# Patient Record
Sex: FEMALE | Race: AMERICAN INDIAN OR ALASKA NATIVE | HISPANIC OR LATINO | Employment: OTHER | URBAN - METROPOLITAN AREA
[De-identification: names, ages, dates, MRNs, and addresses within clinical notes are randomized per-mention and may not be internally consistent; named-entity substitution may affect disease eponyms.]

---

## 2022-03-17 ENCOUNTER — OFFICE VISIT (OUTPATIENT)
Dept: FAMILY MEDICINE CLINIC | Facility: CLINIC | Age: 67
End: 2022-03-17

## 2022-03-17 VITALS
BODY MASS INDEX: 27.08 KG/M2 | HEIGHT: 55 IN | WEIGHT: 117 LBS | SYSTOLIC BLOOD PRESSURE: 128 MMHG | DIASTOLIC BLOOD PRESSURE: 86 MMHG | TEMPERATURE: 96.6 F | HEART RATE: 70 BPM | RESPIRATION RATE: 18 BRPM | OXYGEN SATURATION: 98 %

## 2022-03-17 DIAGNOSIS — Z11.59 NEED FOR HEPATITIS C SCREENING TEST: ICD-10-CM

## 2022-03-17 DIAGNOSIS — Z12.11 COLON CANCER SCREENING: ICD-10-CM

## 2022-03-17 DIAGNOSIS — R41.3 MEMORY LOSS: ICD-10-CM

## 2022-03-17 DIAGNOSIS — Z00.00 ENCOUNTER FOR MEDICAL EXAMINATION TO ESTABLISH CARE: Primary | ICD-10-CM

## 2022-03-17 DIAGNOSIS — R73.09 OTHER ABNORMAL GLUCOSE: ICD-10-CM

## 2022-03-17 DIAGNOSIS — Z23 ENCOUNTER FOR IMMUNIZATION: ICD-10-CM

## 2022-03-17 DIAGNOSIS — Z13.820 OSTEOPOROSIS SCREENING: ICD-10-CM

## 2022-03-17 DIAGNOSIS — Z12.31 ENCOUNTER FOR SCREENING MAMMOGRAM FOR BREAST CANCER: ICD-10-CM

## 2022-03-17 DIAGNOSIS — E66.3 OVERWEIGHT (BMI 25.0-29.9): ICD-10-CM

## 2022-03-17 DIAGNOSIS — M81.0 AGE-RELATED OSTEOPOROSIS WITHOUT CURRENT PATHOLOGICAL FRACTURE: ICD-10-CM

## 2022-03-17 PROCEDURE — 90662 IIV NO PRSV INCREASED AG IM: CPT

## 2022-03-17 PROCEDURE — G0009 ADMIN PNEUMOCOCCAL VACCINE: HCPCS

## 2022-03-17 PROCEDURE — 99204 OFFICE O/P NEW MOD 45 MIN: CPT

## 2022-03-17 PROCEDURE — G0008 ADMIN INFLUENZA VIRUS VAC: HCPCS

## 2022-03-17 PROCEDURE — 90732 PPSV23 VACC 2 YRS+ SUBQ/IM: CPT

## 2022-03-17 NOTE — ASSESSMENT & PLAN NOTE
BMI Counseling: Body mass index is 27 19 kg/m²  The BMI is above normal  Nutrition recommendations include reducing portion sizes, decreasing overall calorie intake, 3-5 servings of fruits/vegetables daily, reducing fast food intake, consuming healthier snacks, decreasing soda and/or juice intake, moderation in carbohydrate intake, increasing intake of lean protein, reducing intake of saturated fat and trans fat and reducing intake of cholesterol  Exercise recommendations include moderate aerobic physical activity for 150 minutes/week   such as walking

## 2022-03-17 NOTE — ASSESSMENT & PLAN NOTE
Reviewed care gaps with pt & importance of completing all tasks for complete healthcare maintenance  Unable to get tdap in office d/t medicare restriction   -Advised pt to schedule cervical CA screening appt  Up to date on covid-19 vaccine   Advised to bring vaccination card to next OV

## 2022-03-17 NOTE — PROGRESS NOTES
Assessment/Plan:    Overweight (BMI 25 0-29  9)  BMI Counseling: Body mass index is 27 19 kg/m²  The BMI is above normal  Nutrition recommendations include reducing portion sizes, decreasing overall calorie intake, 3-5 servings of fruits/vegetables daily, reducing fast food intake, consuming healthier snacks, decreasing soda and/or juice intake, moderation in carbohydrate intake, increasing intake of lean protein, reducing intake of saturated fat and trans fat and reducing intake of cholesterol  Exercise recommendations include moderate aerobic physical activity for 150 minutes/week  such as walking      Encounter for medical examination to establish care  Reviewed care gaps with pt & importance of completing all tasks for complete healthcare maintenance  Unable to get tdap in office d/t medicare restriction   -Advised pt to schedule cervical CA screening appt  Up to date on covid-19 vaccine  Advised to bring vaccination card to next OV    Memory loss  RTC in about 4 weeks for AWV & MOCA test       Diagnoses and all orders for this visit:    Encounter for medical examination to establish care    Osteoporosis screening  -     DXA bone density spine hip and pelvis; Future    Age-related osteoporosis without current pathological fracture   -     DXA bone density spine hip and pelvis; Future    Need for hepatitis C screening test  -     Hepatitis C Antibody ([facility]); Future    Encounter for screening mammogram for breast cancer  -     Mammo screening bilateral w 3d & cad; Future    Encounter for immunization  -     PNEUMOCOCCAL POLYSACCHARIDE VACCINE 23-VALENT =>1YO SQ IM  -     influenza vaccine, high-dose, PF 0 7 mL (FLUZONE HIGH-DOSE)    Overweight (BMI 25 0-29 9)  -     Comprehensive metabolic panel; Future  -     CBC and differential; Future  -     Lipid panel; Future  -     HEMOGLOBIN A1C W/ EAG ESTIMATION;  Future    Colon cancer screening  -     Ambulatory Referral to Gastroenterology; Future    Other abnormal glucose   -     HEMOGLOBIN A1C W/ EAG ESTIMATION; Future    Memory loss  -     Vitamin B12; Future  -     TSH, 3rd generation with Free T4 reflex; Future  -     Folate; Future  -     RPR; Future  -     HIV 1/2 Antigen/Antibody (4th Generation) w Reflex [facility]; Future  -     Lyme Total Antibody Profile with reflex to WB; Future          Subjective:      Patient ID: Radha Pathak is a 74 y o  female  Radha Pathak is a 74 y o  female  has no past medical history on file  has a past surgical history that includes Appendectomy  Pt presents today to establish care  Denies any acute complaints  The following portions of the patient's history were reviewed and updated as appropriate: allergies, current medications, past family history, past medical history, past social history, past surgical history and problem list     Review of Systems   Constitutional: Negative for chills and fever  HENT: Negative for ear pain and sore throat  Eyes: Negative for pain and visual disturbance  Respiratory: Negative for cough and shortness of breath  Cardiovascular: Negative for chest pain and palpitations  Gastrointestinal: Negative for abdominal pain and vomiting  Genitourinary: Negative for dysuria and hematuria  Musculoskeletal: Negative for arthralgias and back pain  Skin: Negative for color change and rash  Neurological: Negative for seizures and syncope  All other systems reviewed and are negative  Objective:      /86 (BP Location: Left arm, Patient Position: Sitting, Cuff Size: Adult)   Pulse 70   Temp (!) 96 6 °F (35 9 °C) (Temporal)   Resp 18   Ht 4' 7" (1 397 m)   Wt 53 1 kg (117 lb)   SpO2 98%   BMI 27 19 kg/m²          Physical Exam  Vitals and nursing note reviewed  Constitutional:       Appearance: Normal appearance  HENT:      Head: Normocephalic and atraumatic        Right Ear: Tympanic membrane, ear canal and external ear normal       Left Ear: Tympanic membrane, ear canal and external ear normal       Nose: Nose normal       Mouth/Throat:      Mouth: Mucous membranes are moist       Pharynx: Oropharynx is clear  Eyes:      Extraocular Movements: Extraocular movements intact  Conjunctiva/sclera: Conjunctivae normal       Pupils: Pupils are equal, round, and reactive to light  Cardiovascular:      Rate and Rhythm: Normal rate and regular rhythm  Pulses: Normal pulses  Heart sounds: Normal heart sounds  Pulmonary:      Effort: Pulmonary effort is normal       Breath sounds: Normal breath sounds  Abdominal:      General: Bowel sounds are normal       Palpations: Abdomen is soft  Tenderness: There is no abdominal tenderness  Musculoskeletal:         General: Normal range of motion  Cervical back: Normal range of motion  Skin:     General: Skin is warm and dry  Neurological:      General: No focal deficit present  Mental Status: She is alert and oriented to person, place, and time  Mental status is at baseline  Psychiatric:         Mood and Affect: Mood normal          Behavior: Behavior normal          Thought Content:  Thought content normal          Judgment: Judgment normal

## 2022-05-09 ENCOUNTER — HOSPITAL ENCOUNTER (OUTPATIENT)
Dept: MAMMOGRAPHY | Facility: CLINIC | Age: 67
Discharge: HOME | End: 2022-05-09
Payer: MEDICARE

## 2022-05-09 DIAGNOSIS — Z12.31 ENCOUNTER FOR SCREENING MAMMOGRAM FOR BREAST CANCER: ICD-10-CM

## 2022-05-09 PROCEDURE — 77067 SCR MAMMO BI INCL CAD: CPT

## 2022-05-09 PROCEDURE — 77063 BREAST TOMOSYNTHESIS BI: CPT

## 2022-05-19 ENCOUNTER — OFFICE VISIT (OUTPATIENT)
Dept: OBGYN CLINIC | Facility: CLINIC | Age: 67
End: 2022-05-19

## 2022-05-19 VITALS
SYSTOLIC BLOOD PRESSURE: 154 MMHG | WEIGHT: 120.8 LBS | DIASTOLIC BLOOD PRESSURE: 82 MMHG | BODY MASS INDEX: 28.08 KG/M2 | HEART RATE: 76 BPM

## 2022-05-19 DIAGNOSIS — Z12.31 ENCOUNTER FOR SCREENING MAMMOGRAM FOR MALIGNANT NEOPLASM OF BREAST: ICD-10-CM

## 2022-05-19 DIAGNOSIS — Z12.39 ENCOUNTER FOR BREAST CANCER SCREENING USING NON-MAMMOGRAM MODALITY: ICD-10-CM

## 2022-05-19 DIAGNOSIS — Z01.419 ENCOUNTER FOR GYNECOLOGICAL EXAMINATION WITHOUT ABNORMAL FINDING: Primary | ICD-10-CM

## 2022-05-19 DIAGNOSIS — Z12.4 SCREENING FOR CERVICAL CANCER: ICD-10-CM

## 2022-05-19 PROBLEM — Z00.00 ENCOUNTER FOR MEDICAL EXAMINATION TO ESTABLISH CARE: Status: RESOLVED | Noted: 2022-03-17 | Resolved: 2022-05-19

## 2022-05-19 PROBLEM — E66.3 OVERWEIGHT (BMI 25.0-29.9): Status: RESOLVED | Noted: 2022-03-17 | Resolved: 2022-05-19

## 2022-05-19 PROBLEM — R41.3 MEMORY LOSS: Status: RESOLVED | Noted: 2022-03-17 | Resolved: 2022-05-19

## 2022-05-19 PROCEDURE — G0101 CA SCREEN;PELVIC/BREAST EXAM: HCPCS | Performed by: NURSE PRACTITIONER

## 2022-05-19 NOTE — PROGRESS NOTES
ANNUAL GYNECOLOGICAL EXAMINATION    French Reaves is a 74 y o  female who presents today for annual GYN exam   She is unsure when her last pap smear was performed but she reports no history of abnormal pap smears in her past   Her last mammogram was performed 2022 and was WNL  She has never had colon cancer screening performed, but is scheduled for consultation with GI on 2022 to discuss colonoscopy  She reports menses as absent since   Her general medical history has been reviewed and she reports it as follows:    Past Medical History:   Diagnosis Date    No known health problems      Past Surgical History:   Procedure Laterality Date    APPENDECTOMY       OB History        1    Para   1    Term   1       0    AB   0    Living   1       SAB   0    IAB   0    Ectopic   0    Multiple   0    Live Births   1               Social History     Tobacco Use    Smoking status: Never Smoker    Smokeless tobacco: Never Used   Vaping Use    Vaping Use: Never used   Substance Use Topics    Alcohol use: Never    Drug use: Never     Social History     Substance and Sexual Activity   Sexual Activity Not Currently    Birth control/protection: Post-menopausal     Cancer-related family history is negative for Breast cancer, Colon cancer, Ovarian cancer, and Cancer  No current outpatient medications    Review of Systems:  Review of Systems   Constitutional: Negative  Gastrointestinal: Negative  Genitourinary: Negative for difficulty urinating, menstrual problem, pelvic pain and vaginal discharge  Skin: Negative  Physical Exam:  /82   Pulse 76   Wt 54 8 kg (120 lb 12 8 oz)   BMI 28 08 kg/m²   Physical Exam  Constitutional:       General: She is not in acute distress  Appearance: She is well-developed  Genitourinary:      Vulva normal       No lesions in the vagina  Moderate vaginal atrophy present         Right Adnexa: not tender and no mass present  Left Adnexa: not tender and no mass present  No cervical motion tenderness  Uterus is not tender  Breasts:      Right: No mass, nipple discharge, skin change or tenderness  Left: No mass, nipple discharge, skin change or tenderness  Neck:      Thyroid: No thyromegaly  Cardiovascular:      Rate and Rhythm: Normal rate and regular rhythm  Pulmonary:      Effort: Pulmonary effort is normal    Abdominal:      Palpations: Abdomen is soft  Tenderness: There is no abdominal tenderness  Musculoskeletal:      Cervical back: Neck supple  Neurological:      Mental Status: She is alert and oriented to person, place, and time  Skin:     General: Skin is warm and dry  Vitals reviewed  Speculum exam deferred  Assessment/Plan:   1  Normal well-woman GYN exam   2  Cervical cancer screening:  Normal pelvic exam   Pap smear and cervical cancer screening no longer indicated  3  STD screening:  Patient declines  4  Breast cancer screening:  Normal breast exam   Repeat screening mammogram next year  5  Colon cancer screening:  Continue with GI consultation as previously scheduled per PCP on 6/20/2022  6  Depression Screening: Patient's depression screening was assessed with a PHQ-2 score of 1  Their PHQ-9 score was 3  Clinically patient does not have depression  No treatment is required  7  BMI Counseling: Body mass index is 28 08 kg/m²  No intervention indicated  8  Return to GYN is only indicated if she is experiencing GYN issues  Otherwise, she can follow with her PCP for routine cancer screenings

## 2022-06-20 ENCOUNTER — HOSPITAL ENCOUNTER (OUTPATIENT)
Dept: BONE DENSITY | Facility: CLINIC | Age: 67
Discharge: HOME | End: 2022-06-20
Payer: MEDICARE

## 2022-06-20 ENCOUNTER — OFFICE VISIT (OUTPATIENT)
Dept: GASTROENTEROLOGY | Facility: CLINIC | Age: 67
End: 2022-06-20
Payer: MEDICARE

## 2022-06-20 VITALS
HEIGHT: 55 IN | WEIGHT: 118.5 LBS | BODY MASS INDEX: 27.42 KG/M2 | OXYGEN SATURATION: 99 % | HEART RATE: 84 BPM | DIASTOLIC BLOOD PRESSURE: 74 MMHG | SYSTOLIC BLOOD PRESSURE: 132 MMHG

## 2022-06-20 DIAGNOSIS — M81.0 AGE-RELATED OSTEOPOROSIS WITHOUT CURRENT PATHOLOGICAL FRACTURE: ICD-10-CM

## 2022-06-20 DIAGNOSIS — Z13.820 OSTEOPOROSIS SCREENING: ICD-10-CM

## 2022-06-20 DIAGNOSIS — M85.89 OSTEOPENIA OF MULTIPLE SITES: Primary | ICD-10-CM

## 2022-06-20 DIAGNOSIS — Z12.11 COLON CANCER SCREENING: ICD-10-CM

## 2022-06-20 PROCEDURE — 99203 OFFICE O/P NEW LOW 30 MIN: CPT | Performed by: STUDENT IN AN ORGANIZED HEALTH CARE EDUCATION/TRAINING PROGRAM

## 2022-06-20 PROCEDURE — 77080 DXA BONE DENSITY AXIAL: CPT

## 2022-06-20 NOTE — PROGRESS NOTES
[facility] - Outpatient Note  Raymond Haywood 69 y o  female MRN: [id number]  Encounter: [id number]      ASSESSMENT AND PLAN:    Raymond Haywood is a 69 y o  old female with PMH of appendectomy, memory loss who presents to discuss colonoscopy for colon cancer screening  Colon cancer screening - unclear if patient had a colonoscopy in the past   I suspect patient has never had a colonoscopy  No family history of colon cancer  Asymptomatic  No warning symptoms  · Plan for colonoscopy        1  Colon cancer screening  - Ambulatory Referral to Gastroenterology    ______________________________________________________________________    SUBJECTIVE:  Raymond Haywood is a 69 y o  old female with PMH of been duct who presents to discuss colonoscopy for colon cancer screening  Patient overall of a poor historian unable to give details about her history of Patient denies abdominal pain, nausea, vomiting, constipation, diarrhea, fevers/chills  Patient reports no family history of colon cancer  Patient unsure if she had a colonoscopy in the past   Initially patient thought she had a colonoscopy 1 year ago at [address] [place] [address] but then retracted that statement and stated she thinks he may have been somewhere else but unsure about the facility and unsure about the results  No NSAIDs or blood thinners  No significant alcohol or smoking history  REVIEW OF SYSTEMS IS OTHERWISE NEGATIVE        Historical Information   Past Medical History:   Diagnosis Date    No known health problems      Past Surgical History:   Procedure Laterality Date    APPENDECTOMY  2010     Social History   Social History     Substance and Sexual Activity   Alcohol Use Never     Social History     Substance and Sexual Activity   Drug Use Never     Social History     Tobacco Use   Smoking Status Never Smoker   Smokeless Tobacco Never Used     Family History   Problem Relation Age of Onset    Breast cancer Neg Hx     Colon cancer Neg Hx     Ovarian cancer Neg Hx     Cancer Neg Hx        Meds/Allergies     No current outpatient medications on file  No Known Allergies        Objective     Blood pressure 132/74, pulse 84, height 4' 7" (1 397 m), weight 53 8 kg (118 lb 8 oz), SpO2 99 %  Body mass index is 27 54 kg/m²  PHYSICAL EXAM:      General Appearance:   Alert, cooperative, no distress   HEENT:   Normocephalic, atraumatic, anicteric  Neck:  Supple, symmetrical, trachea midline   Lungs:   Clear to auscultation bilaterally; no rales, rhonchi or wheezing; respirations unlabored    Heart[de-identified]   Regular rate and rhythm; no murmur, rub, or gallop  Abdomen:   Soft, non-tender, non-distended; normal bowel sounds; no masses, no organomegaly    Genitalia:   Deferred    Rectal:   Deferred    Extremities:  No cyanosis, clubbing or edema    Pulses:  2+ and symmetric    Skin:  No jaundice, rashes, or lesions    Lymph nodes:  No palpable cervical lymphadenopathy        Lab Results:   No visits with results within 1 Day(s) from this visit  Latest known visit with results is:   No results found for any previous visit  Radiology Results:   No results found

## 2022-06-20 NOTE — PATIENT INSTRUCTIONS
Scheduled date of colon (as of today) 7/18/22  Physician performing: Stephanie Garcia  Location of procedure:  [facility]  Bowel prep reviewed with patient: yaneth/dulcolax  Instructions reviewed with patient by: MA  Clearances: N/A

## 2022-07-14 ENCOUNTER — LAB (OUTPATIENT)
Dept: LAB | Facility: CLINIC | Age: 67
End: 2022-07-14
Payer: MEDICARE

## 2022-07-14 DIAGNOSIS — R73.09 OTHER ABNORMAL GLUCOSE: ICD-10-CM

## 2022-07-14 DIAGNOSIS — E66.3 OVERWEIGHT (BMI 25.0-29.9): ICD-10-CM

## 2022-07-14 DIAGNOSIS — Z11.59 NEED FOR HEPATITIS C SCREENING TEST: ICD-10-CM

## 2022-07-14 DIAGNOSIS — R41.3 MEMORY LOSS: ICD-10-CM

## 2022-07-14 LAB
ALBUMIN SERPL BCP-MCNC: 3.5 G/DL (ref 3.5–5)
ALP SERPL-CCNC: 101 U/L (ref 46–116)
ALT SERPL W P-5'-P-CCNC: 19 U/L (ref 12–78)
ANION GAP SERPL CALCULATED.3IONS-SCNC: 1 MMOL/L (ref 4–13)
AST SERPL W P-5'-P-CCNC: 18 U/L (ref 5–45)
BASOPHILS # BLD AUTO: 0.06 THOUSANDS/ΜL (ref 0–0.1)
BASOPHILS NFR BLD AUTO: 1 % (ref 0–1)
BILIRUB SERPL-MCNC: 0.66 MG/DL (ref 0.2–1)
BUN SERPL-MCNC: 14 MG/DL (ref 5–25)
CALCIUM SERPL-MCNC: 9.5 MG/DL (ref 8.3–10.1)
CHLORIDE SERPL-SCNC: 109 MMOL/L (ref 100–108)
CHOLEST SERPL-MCNC: 216 MG/DL
CO2 SERPL-SCNC: 30 MMOL/L (ref 21–32)
CREAT SERPL-MCNC: 0.76 MG/DL (ref 0.6–1.3)
EOSINOPHIL # BLD AUTO: 0.16 THOUSAND/ΜL (ref 0–0.61)
EOSINOPHIL NFR BLD AUTO: 2 % (ref 0–6)
ERYTHROCYTE [DISTWIDTH] IN BLOOD BY AUTOMATED COUNT: 13.2 % (ref 11.6–15.1)
EST. AVERAGE GLUCOSE BLD GHB EST-MCNC: 114 MG/DL
FOLATE SERPL-MCNC: >20 NG/ML (ref 3.1–17.5)
GFR SERPL CREATININE-BSD FRML MDRD: 80 ML/MIN/1.73SQ M
GLUCOSE P FAST SERPL-MCNC: 82 MG/DL (ref 65–99)
HBA1C MFR BLD: 5.6 %
HCT VFR BLD AUTO: 45.6 % (ref 34.8–46.1)
HCV AB SER QL: NORMAL
HDLC SERPL-MCNC: 61 MG/DL
HGB BLD-MCNC: 14.4 G/DL (ref 11.5–15.4)
IMM GRANULOCYTES # BLD AUTO: 0.01 THOUSAND/UL (ref 0–0.2)
IMM GRANULOCYTES NFR BLD AUTO: 0 % (ref 0–2)
LDLC SERPL CALC-MCNC: 135 MG/DL (ref 0–100)
LYMPHOCYTES # BLD AUTO: 2 THOUSANDS/ΜL (ref 0.6–4.47)
LYMPHOCYTES NFR BLD AUTO: 28 % (ref 14–44)
MCH RBC QN AUTO: 30.1 PG (ref 26.8–34.3)
MCHC RBC AUTO-ENTMCNC: 31.6 G/DL (ref 31.4–37.4)
MCV RBC AUTO: 95 FL (ref 82–98)
MONOCYTES # BLD AUTO: 0.51 THOUSAND/ΜL (ref 0.17–1.22)
MONOCYTES NFR BLD AUTO: 7 % (ref 4–12)
NEUTROPHILS # BLD AUTO: 4.29 THOUSANDS/ΜL (ref 1.85–7.62)
NEUTS SEG NFR BLD AUTO: 62 % (ref 43–75)
NONHDLC SERPL-MCNC: 155 MG/DL
NRBC BLD AUTO-RTO: 0 /100 WBCS
PLATELET # BLD AUTO: 220 THOUSANDS/UL (ref 149–390)
PMV BLD AUTO: 12.1 FL (ref 8.9–12.7)
POTASSIUM SERPL-SCNC: 4.2 MMOL/L (ref 3.5–5.3)
PROT SERPL-MCNC: 7.7 G/DL (ref 6.4–8.2)
RBC # BLD AUTO: 4.79 MILLION/UL (ref 3.81–5.12)
SODIUM SERPL-SCNC: 140 MMOL/L (ref 136–145)
TRIGL SERPL-MCNC: 102 MG/DL
TSH SERPL DL<=0.05 MIU/L-ACNC: 1.48 UIU/ML (ref 0.45–4.5)
VIT B12 SERPL-MCNC: 431 PG/ML (ref 100–900)
WBC # BLD AUTO: 7.03 THOUSAND/UL (ref 4.31–10.16)

## 2022-07-14 PROCEDURE — 82607 VITAMIN B-12: CPT

## 2022-07-14 PROCEDURE — 80061 LIPID PANEL: CPT

## 2022-07-14 PROCEDURE — 36415 COLL VENOUS BLD VENIPUNCTURE: CPT

## 2022-07-14 PROCEDURE — 86618 LYME DISEASE ANTIBODY: CPT

## 2022-07-14 PROCEDURE — 87389 HIV-1 AG W/HIV-1&-2 AB AG IA: CPT

## 2022-07-14 PROCEDURE — 83036 HEMOGLOBIN GLYCOSYLATED A1C: CPT

## 2022-07-14 PROCEDURE — 85025 COMPLETE CBC W/AUTO DIFF WBC: CPT

## 2022-07-14 PROCEDURE — 80053 COMPREHEN METABOLIC PANEL: CPT

## 2022-07-14 PROCEDURE — 82746 ASSAY OF FOLIC ACID SERUM: CPT

## 2022-07-14 PROCEDURE — 84443 ASSAY THYROID STIM HORMONE: CPT

## 2022-07-14 PROCEDURE — 86592 SYPHILIS TEST NON-TREP QUAL: CPT

## 2022-07-14 PROCEDURE — 86803 HEPATITIS C AB TEST: CPT

## 2022-07-15 LAB
B BURGDOR IGG+IGM SER-ACNC: <0.2 AI
HIV 1+2 AB+HIV1 P24 AG SERPL QL IA: NORMAL
RPR SER QL: NORMAL

## 2022-07-18 ENCOUNTER — ANESTHESIA EVENT (OUTPATIENT)
Dept: GASTROENTEROLOGY | Facility: HOSPITAL | Age: 67
End: 2022-07-18

## 2022-07-18 ENCOUNTER — HOSPITAL ENCOUNTER (OUTPATIENT)
Dept: GASTROENTEROLOGY | Facility: HOSPITAL | Age: 67
Setting detail: OUTPATIENT SURGERY
Discharge: HOME | End: 2022-07-18
Attending: INTERNAL MEDICINE | Admitting: INTERNAL MEDICINE
Payer: MEDICARE

## 2022-07-18 ENCOUNTER — ANESTHESIA (OUTPATIENT)
Dept: GASTROENTEROLOGY | Facility: HOSPITAL | Age: 67
End: 2022-07-18

## 2022-07-18 VITALS
HEART RATE: 80 BPM | RESPIRATION RATE: 18 BRPM | SYSTOLIC BLOOD PRESSURE: 120 MMHG | OXYGEN SATURATION: 97 % | TEMPERATURE: 97.6 F | DIASTOLIC BLOOD PRESSURE: 72 MMHG | WEIGHT: 118 LBS | BODY MASS INDEX: 27.31 KG/M2 | HEIGHT: 55 IN

## 2022-07-18 DIAGNOSIS — Z12.11 COLON CANCER SCREENING: ICD-10-CM

## 2022-07-18 PROCEDURE — G0121 COLON CA SCRN NOT HI RSK IND: HCPCS | Performed by: INTERNAL MEDICINE

## 2022-07-18 RX ORDER — SODIUM CHLORIDE 9 MG/ML
125 INJECTION, SOLUTION INTRAVENOUS CONTINUOUS
Status: CANCELLED | OUTPATIENT
Start: 2022-07-18

## 2022-07-18 RX ORDER — LIDOCAINE HYDROCHLORIDE 10 MG/ML
INJECTION, SOLUTION EPIDURAL; INFILTRATION; INTRACAUDAL; PERINEURAL AS NEEDED
Status: DISCONTINUED | OUTPATIENT
Start: 2022-07-18 | End: 2022-07-18

## 2022-07-18 RX ORDER — SODIUM CHLORIDE 9 MG/ML
125 INJECTION, SOLUTION INTRAVENOUS CONTINUOUS
Status: DISCONTINUED | OUTPATIENT
Start: 2022-07-18 | End: 2022-07-22 | Stop reason: HOSPADM

## 2022-07-18 RX ORDER — PROPOFOL 10 MG/ML
INJECTION, EMULSION INTRAVENOUS AS NEEDED
Status: DISCONTINUED | OUTPATIENT
Start: 2022-07-18 | End: 2022-07-18

## 2022-07-18 RX ADMIN — PROPOFOL 50 MG: 10 INJECTION, EMULSION INTRAVENOUS at 11:47

## 2022-07-18 RX ADMIN — PROPOFOL 50 MG: 10 INJECTION, EMULSION INTRAVENOUS at 11:54

## 2022-07-18 RX ADMIN — PROPOFOL 10 MG: 10 INJECTION, EMULSION INTRAVENOUS at 12:10

## 2022-07-18 RX ADMIN — SODIUM CHLORIDE 125 ML/HR: 0.9 INJECTION, SOLUTION INTRAVENOUS at 10:50

## 2022-07-18 RX ADMIN — LIDOCAINE HYDROCHLORIDE 50 MG: 10 INJECTION, SOLUTION EPIDURAL; INFILTRATION; INTRACAUDAL at 11:46

## 2022-07-18 RX ADMIN — PROPOFOL 50 MG: 10 INJECTION, EMULSION INTRAVENOUS at 11:53

## 2022-07-18 RX ADMIN — PROPOFOL 100 MG: 10 INJECTION, EMULSION INTRAVENOUS at 11:46

## 2022-07-18 RX ADMIN — PROPOFOL 50 MG: 10 INJECTION, EMULSION INTRAVENOUS at 11:57

## 2022-07-18 RX ADMIN — PROPOFOL 50 MG: 10 INJECTION, EMULSION INTRAVENOUS at 12:03

## 2022-07-18 RX ADMIN — PROPOFOL 50 MG: 10 INJECTION, EMULSION INTRAVENOUS at 11:50

## 2022-07-18 NOTE — H&P
H&P EXAM - Outpatient Endoscopy   Liseth Guzman 69 y o  female MRN: [id number]    [address] ENDOSCOPY   Encounter: [id number]        History and Physical - [facility]  Yohana Bermudez 69 y o  female MRN: [id number]                  HPI: Yohana Bermudez is a 69y o  year old female who presents for colon cancer screening  REVIEW OF SYSTEMS: Per the HPI, and otherwise unremarkable  Historical Information   Past Medical History:   Diagnosis Date    No known health problems      Past Surgical History:   Procedure Laterality Date    APPENDECTOMY  2010     Social History   Social History     Substance and Sexual Activity   Alcohol Use Never     Social History     Substance and Sexual Activity   Drug Use Never     Social History     Tobacco Use   Smoking Status Never Smoker   Smokeless Tobacco Never Used     Family History   Problem Relation Age of Onset    Breast cancer Neg Hx     Colon cancer Neg Hx     Ovarian cancer Neg Hx     Cancer Neg Hx        Meds/Allergies       Current Outpatient Medications:     Calcium Carb-Cholecalciferol (OSCAL-D) 500 mg-200 units per tablet    No Known Allergies    Objective     There were no vitals taken for this visit  PHYSICAL EXAM    Gen: NAD  Head: NCAT  CV: RRR  CHEST: Clear  ABD: soft, NT/ND  EXT: no edema      ASSESSMENT/PLAN:  This is a 69y o  year old female here for colonoscopy, and she is stable and optimized for her procedure

## 2022-07-18 NOTE — ANESTHESIA PREPROCEDURE EVALUATION
Procedure:  COLONOSCOPY    Relevant Problems   No relevant active problems        Physical Exam    Airway    Mallampati score: II  TM Distance: <3 FB  Neck ROM: full     Dental       Cardiovascular  Cardiovascular exam normal    Pulmonary  Pulmonary exam normal     Other Findings        Anesthesia Plan  ASA Score- 2     Anesthesia Type- IV sedation with anesthesia with ASA Monitors  Additional Monitors:   Airway Plan:           Plan Factors-Exercise tolerance (METS): >4 METS  Chart reviewed  Existing labs reviewed  Patient is not a current smoker  Patient not instructed to abstain from smoking on day of procedure  Patient did not smoke on day of surgery  Induction- intravenous  Postoperative Plan-     Informed Consent- Anesthetic plan and risks discussed with patient  I personally reviewed this patient with the CRNA  Discussed and agreed on the Anesthesia Plan with the CRNA  Rojelio Hernandez

## 2022-07-21 NOTE — RESULT ENCOUNTER NOTE
Please let patient know BW results show her cholesterol is slightly elevated  Suggest she follow a low fat diet by cutting out fried/fatty foods, fast and processed foods and red meats and increasing whole grains and fresh vegetables  Suggest she exercise regularly, walk 30 minutes daily 5 to6 times a week   No other significant abnormalities

## 2022-10-31 ENCOUNTER — OFFICE VISIT (OUTPATIENT)
Dept: FAMILY MEDICINE CLINIC | Facility: CLINIC | Age: 67
End: 2022-10-31

## 2022-10-31 VITALS
TEMPERATURE: 98 F | SYSTOLIC BLOOD PRESSURE: 146 MMHG | RESPIRATION RATE: 18 BRPM | OXYGEN SATURATION: 99 % | HEIGHT: 55 IN | BODY MASS INDEX: 26.47 KG/M2 | WEIGHT: 114.4 LBS | DIASTOLIC BLOOD PRESSURE: 80 MMHG | HEART RATE: 75 BPM

## 2022-10-31 DIAGNOSIS — R41.3 MEMORY LOSS: ICD-10-CM

## 2022-10-31 DIAGNOSIS — R41.89 COGNITIVE IMPAIRMENT: Primary | ICD-10-CM

## 2022-10-31 RX ORDER — DONEPEZIL HYDROCHLORIDE 5 MG/1
5 TABLET, FILM COATED ORAL
Qty: 30 TABLET | Refills: 2 | Status: SHIPPED | OUTPATIENT
Start: 2022-10-31

## 2022-10-31 NOTE — PROGRESS NOTES
[facility]    NAME: Liseth Guzman  AGE: 69 y o  SEX: female  : 1955     DATE: 10/31/2022     Assessment and Plan:     Problem List Items Addressed This Visit        Other    Cognitive impairment - Primary     MMSE completed with assistance of Burmese   Eliminating question 8, and adjusting question 4 for Burmese equivalent, patient scored 9/29 possible points, indicating moderate to severe impairment  Pt was not oriented to place or time  Pt recognizes that she has some impairment and became upset by her inability to answer the questions  Pt is only prescribed calcium with D, polypharmacy is not a suspected cause  Labs done in July were WNL  - Referral to Geriatric Medicine   - MRI brain  - Start Aricept 5 mg daily HS  Reviewed expected outcomes and possible side effects    - Follow up in one month  Relevant Orders    Ambulatory Referral to [facility]    MRI brain wo contrast      Other Visit Diagnoses     Memory loss        Relevant Medications    donepezil (ARICEPT) 5 mg tablet    Other Relevant Orders    Ambulatory Referral to [place] [id number]    MRI brain wo contrast             Return in about 4 weeks (around 2022) for Follow up memory  Chief Complaint:     Chief Complaint   Patient presents with   • Memory Loss      History of Present Illness:     Burmese language interpretation services were utilized for this visit  Yamileth Slade presented to the office accompanied by her son for worsening memory loss  Son states this has been a concern for some time, but pt seems to be declining faster over the past 2-3 months  Son reports nightmares, forgetting things/location of things, having to repeat things  Denies behavioral disturbances  Reports increased difficulty with balance  Pt reports she is concerned about her memory   Denies depression, hallucinations, trouble sleeping, difficulty hearing  Reports adequate diet  Daily activities consist mainly of staying home and watching TV  Depression Screening  PHQ-2/9 Depression Screening    Little interest or pleasure in doing things: 0 - not at all  Feeling down, depressed, or hopeless: 0 - not at all  PHQ-2 Score: 0  PHQ-2 Interpretation: Negative depression screen          Review of Systems:     Review of Systems   Constitutional: Negative for activity change, appetite change, fatigue, fever and unexpected weight change  Respiratory: Negative  Cardiovascular: Negative  Gastrointestinal: Negative  Neurological: Negative for dizziness, seizures, syncope, light-headedness and headaches  Psychiatric/Behavioral: Positive for confusion and sleep disturbance  Negative for agitation, behavioral problems, dysphoric mood, hallucinations and suicidal ideas  The patient is not nervous/anxious  All other systems reviewed and are negative  Past Medical History:     Past Medical History:   Diagnosis Date   • No known health problems       Past Surgical History:     Past Surgical History:   Procedure Laterality Date   • APPENDECTOMY  2010      Social History:     Social History     Socioeconomic History   • Marital status:       Spouse name: None   • Number of children: None   • Years of education: None   • Highest education level: None   Occupational History   • None   Tobacco Use   • Smoking status: Never Smoker   • Smokeless tobacco: Never Used   Vaping Use   • Vaping Use: Never used   Substance and Sexual Activity   • Alcohol use: Never   • Drug use: Never   • Sexual activity: Not Currently     Birth control/protection: Post-menopausal   Other Topics Concern   • None   Social History Narrative   • None     Social Determinants of Health     Financial Resource Strain: Low Risk    • Difficulty of Paying Living Expenses: Not hard at all   Food Insecurity: No Food Insecurity   • Worried About [address] in the Last Year: Never true   • Ran Out of Food in the Last Year: Never true   Transportation Needs: No Transportation Needs   • Lack of Transportation (Medical): No   • Lack of Transportation (Non-Medical): No   Physical Activity: Not on file   Stress: Not on file   Social Connections: Not on file   Intimate Partner Violence: Not on file   Housing Stability: Not on file      Family History:     Family History   Problem Relation Age of Onset   • Breast cancer Neg Hx    • Colon cancer Neg Hx    • Ovarian cancer Neg Hx    • Cancer Neg Hx       Current Medications:     Current Outpatient Medications   Medication Sig Dispense Refill   • donepezil (ARICEPT) 5 mg tablet Take 1 tablet (5 mg total) by mouth daily at bedtime 30 tablet 2   • Calcium Carb-Cholecalciferol (OSCAL-D) 500 mg-200 units per tablet Take 1 tablet by mouth 2 (two) times a day with meals 180 tablet 1     No current facility-administered medications for this visit  Allergies:     No Known Allergies   Physical Exam:     /80 (BP Location: Right arm, Patient Position: Sitting, Cuff Size: Standard)   Pulse 75   Temp 98 °F (36 7 °C) (Temporal)   Resp 18   Ht 4' 7" (1 397 m)   Wt 51 9 kg (114 lb 6 4 oz)   SpO2 99%   BMI 26 59 kg/m²     Physical Exam  Vitals reviewed  Constitutional:       General: She is not in acute distress  Appearance: She is overweight  She is not ill-appearing or diaphoretic  HENT:      Head: Normocephalic and atraumatic  Cardiovascular:      Rate and Rhythm: Normal rate and regular rhythm  Heart sounds: Normal heart sounds  No murmur heard  Pulmonary:      Effort: Pulmonary effort is normal  No tachypnea  Breath sounds: Normal breath sounds  No decreased breath sounds or wheezing  Skin:     General: Skin is warm and dry  Neurological:      Mental Status: She is alert  Cranial Nerves: No cranial nerve deficit or facial asymmetry  Motor: Motor function is intact        Comments: Oriented to person only, not oriented to place or time  Psychiatric:         Attention and Perception: Attention normal          Mood and Affect: Affect normal  Mood is anxious  Cognition and Memory: Cognition is impaired  Memory is impaired            Kaz Carson 32

## 2022-11-01 ENCOUNTER — TELEPHONE (OUTPATIENT)
Dept: GERIATRICS | Age: 67
End: 2022-11-01

## 2022-11-01 NOTE — ASSESSMENT & PLAN NOTE
MMSE completed with assistance of Jordanian   Eliminating question 8, and adjusting question 4 for Jordanian equivalent, patient scored 9/29 possible points, indicating moderate to severe impairment  Pt was not oriented to place or time  Pt recognizes that she has some impairment and became upset by her inability to answer the questions  Pt is only prescribed calcium with D, polypharmacy is not a suspected cause  Labs done in July were WNL  - Referral to Geriatric Medicine   - MRI brain  - Start Aricept 5 mg daily HS  Reviewed expected outcomes and possible side effects    - Follow up in one month

## 2022-11-01 NOTE — TELEPHONE ENCOUNTER
Mathieu Ashley [address] 1 [facility], [facility]7 [facility]    [phone]    Telephone Intake: Geriatric Assessment     Referral source: BERNARD Arreola    Caller who is scheduling/relationship to pt: Twila fry phone number: [phone]    Reason for referral: Patient concerns , Family member concerns and Provider concerns regarding memory concerns  If there are behavioral concerns, is the pt prescribed medications to manage these? no   If so, how many? none   Has the patient ever had an inpatient psychiatric hospitalization? No,   What is the goal of the visit? initial assessment and diagnosis     Has the patient been seen by a Neurologist or Geriatrician? No   If yes, is this appointment for a second opinion? No  Has the patient ever been diagnosed with dementia? No      Preferred language? Tamazight, will eliecer   Highest education level? unknown  Does the patient wear glasses? Yes   Does the patient use hearing aids? No     Is there a living will/healthcare POA in place/If so, who? No,     Does the pt/caregiver have access for a virtual visit (computer/smart phone with audio/video)? No    Caller was informed:   Please make sure the pt is accompanied by someone who knows them well / caregiver / family member to participate in this appointment  Who will accompany the pt (name and relationship)? Elyssa Fry  Phone number of person accompanying pt: [phone]  Please make sure the pt attends all appointments, including the assessment, care conference, follow-up, whether in-person or virtual     Office packet mailed out to: [address] [address]    Added to wait list for sooner appointments?  Yes     NOTE FOR : Please route to provider for chart review prior to scheduling and let the caller know that this phone intake will be reviewed IF -  • Pt was recently hospitalized  • Pt is prescribed medications for behavior management or has a history of psychiatric hospitalization  • Pt plans to attend alone

## 2022-11-11 ENCOUNTER — HOSPITAL ENCOUNTER (OUTPATIENT)
Dept: MRI IMAGING | Facility: HOSPITAL | Age: 67
End: 2022-11-11

## 2022-11-11 DIAGNOSIS — R41.89 COGNITIVE IMPAIRMENT: ICD-10-CM

## 2022-11-11 DIAGNOSIS — R41.3 MEMORY LOSS: ICD-10-CM

## 2022-11-20 ENCOUNTER — RA CDI HCC (OUTPATIENT)
Dept: OTHER | Facility: HOSPITAL | Age: 67
End: 2022-11-20

## 2022-11-20 NOTE — PROGRESS NOTES
[address]  coding opportunities       Chart reviewed, no opportunity found: CHART REVIEWED, NO OPPORTUNITY FOUND        Patients Insurance     Medicare Insurance: Medicare

## 2022-11-30 ENCOUNTER — OFFICE VISIT (OUTPATIENT)
Dept: FAMILY MEDICINE CLINIC | Facility: CLINIC | Age: 67
End: 2022-11-30

## 2022-11-30 VITALS
DIASTOLIC BLOOD PRESSURE: 80 MMHG | BODY MASS INDEX: 28.23 KG/M2 | RESPIRATION RATE: 18 BRPM | HEART RATE: 81 BPM | SYSTOLIC BLOOD PRESSURE: 142 MMHG | HEIGHT: 55 IN | OXYGEN SATURATION: 99 % | TEMPERATURE: 96.4 F | WEIGHT: 122 LBS

## 2022-11-30 DIAGNOSIS — Z23 ENCOUNTER FOR IMMUNIZATION: ICD-10-CM

## 2022-11-30 DIAGNOSIS — R41.89 COGNITIVE IMPAIRMENT: Primary | ICD-10-CM

## 2022-11-30 DIAGNOSIS — I10 PRIMARY HYPERTENSION: ICD-10-CM

## 2022-11-30 PROBLEM — M85.89 OSTEOPENIA OF MULTIPLE SITES: Status: ACTIVE | Noted: 2022-11-30

## 2022-11-30 RX ORDER — LISINOPRIL 10 MG/1
10 TABLET ORAL DAILY
Qty: 90 TABLET | Refills: 0 | Status: SHIPPED | OUTPATIENT
Start: 2022-11-30

## 2022-11-30 NOTE — PROGRESS NOTES
Name: Camden Moses      : 1955      MRN: [id number]  Encounter Provider: BERNARD Alexander  Encounter Date: 2022   Encounter department: [address] [place] [address]     1  Cognitive impairment  Assessment & Plan:  - continue aricept 5 mg  -Referred to social work to help son with community services & obtain waiver services  - f/u geriatric medicine as scheduled    Orders:  -     Ambulatory Referral to Social Work Care Management Program; Future    2  Encounter for immunization  -     influenza vaccine, high-dose, PF 0 7 mL (FLUZONE HIGH-DOSE)  -     Age 15 y+, BOOSTER (BIVALENT): [address] vac bivalent richar-sucr    3  Primary hypertension  Assessment & Plan:  BP Readings from Last 3 Encounters:   22 142/80   10/31/22 146/80   22 120/72     - BP elevated prior to initiation of aricept  - Will start lisinopril 10 mg daily  -reviewed eating a low salt diet (such as the DASH diet), limiting alcohol, exercising such was walking      Orders:  -     lisinopril (ZESTRIL) 10 mg tablet; Take 1 tablet (10 mg total) by mouth daily         Subjective      Camden Moses is a 69 y o  female  has a past medical history cognitive impairment  has a past surgical history that includes Appendectomy ()  She presents today to follow up on her cognitive impairment  She was started on aricept 5 mg about 4 weeks ago  Doing well with aricept- denies any issues  The son provides her around the clock care  He denies needing help at home  Review of Systems   Constitutional: Negative for chills and fever  HENT: Negative for ear pain and sore throat  Eyes: Negative for pain and visual disturbance  Respiratory: Negative for cough and shortness of breath  Cardiovascular: Negative for chest pain and palpitations  Gastrointestinal: Negative for abdominal pain and vomiting  Genitourinary: Negative for dysuria and hematuria  Musculoskeletal: Negative for arthralgias and back pain  Skin: Negative for color change and rash  Neurological: Negative for seizures and syncope  (+) memory loss   All other systems reviewed and are negative  Current Outpatient Medications on File Prior to Visit   Medication Sig   • Calcium Carb-Cholecalciferol (OSCAL-D) 500 mg-200 units per tablet Take 1 tablet by mouth 2 (two) times a day with meals   • donepezil (ARICEPT) 5 mg tablet TAKE 1 TABLET BY MOUTH AT BEDTIME       Objective     /80 (BP Location: Left arm, Patient Position: Sitting, Cuff Size: Adult)   Pulse 81   Temp (!) 96 4 °F (35 8 °C) (Temporal)   Resp 18   Ht 4' 7" (1 397 m)   Wt 55 3 kg (122 lb)   SpO2 99%   BMI 28 36 kg/m²     Physical Exam  Vitals and nursing note reviewed  HENT:      Head: Normocephalic and atraumatic  Right Ear: External ear normal       Left Ear: External ear normal       Nose: Nose normal    Eyes:      Extraocular Movements: Extraocular movements intact  Conjunctiva/sclera: Conjunctivae normal       Pupils: Pupils are equal, round, and reactive to light  Cardiovascular:      Rate and Rhythm: Normal rate and regular rhythm  Pulses: Normal pulses  Heart sounds: Normal heart sounds  Pulmonary:      Effort: Pulmonary effort is normal       Breath sounds: Normal breath sounds  Abdominal:      General: Bowel sounds are normal       Palpations: Abdomen is soft  Tenderness: There is no abdominal tenderness  Musculoskeletal:         General: Normal range of motion  Cervical back: Normal range of motion  Skin:     General: Skin is warm and dry  Neurological:      General: No focal deficit present  Mental Status: She is alert  Mental status is at baseline  She is disoriented        Comments: - Disoriented to time  - oriented to person and place   Psychiatric:         Mood and Affect: Mood normal          Behavior: Behavior normal          Thought Content: Thought content normal        BERNARD Estrada

## 2022-11-30 NOTE — ASSESSMENT & PLAN NOTE
BP Readings from Last 3 Encounters:   11/30/22 142/80   10/31/22 146/80   07/18/22 120/72     - BP elevated prior to initiation of aricept    - Will start lisinopril 10 mg daily  -reviewed eating a low salt diet (such as the DASH diet), limiting alcohol, exercising such was walking

## 2022-11-30 NOTE — ASSESSMENT & PLAN NOTE
- continue aricept 5 mg  -Referred to social work to help son with community services & obtain waiver services     - f/u geriatric medicine as scheduled

## 2022-12-13 NOTE — PROGRESS NOTES
Name: Lion Mckay      : 1955      MRN: [id number]  Encounter Provider: BERNARD Brice  Encounter Date: 2022   Encounter department: [address] [place] [address]  Primary hypertension  Assessment & Plan:  BP Readings from Last 3 Encounters:   22 130/80   22 142/80   10/31/22 146/80     BP at goal at today's visit with recent addition of lisinopril  Recommend continue lisinopril 10 mg daily  Counseled on importance of following heart healthy diet (limit processed/pre-packaged/fast foods, increase fruits, vegetable, whole grains and lean meats)  Subjective      Lion Mckay is a 69 y o  female  has a past medical history of No known health problems  has a past surgical history that includes Appendectomy ()  She presents today for a HTN follow-up  She reports that she has been taking lisinopril daily as prescribed and is tolerating well  Review of Systems   Constitutional: Negative for chills and fever  HENT: Negative for ear pain and sore throat  Eyes: Negative for pain and visual disturbance  Respiratory: Negative for cough and shortness of breath  Cardiovascular: Negative for chest pain, palpitations and leg swelling  Gastrointestinal: Negative for abdominal pain and vomiting  Genitourinary: Negative for dysuria and hematuria  Musculoskeletal: Negative for arthralgias and back pain  Skin: Negative for color change and rash  Neurological: Negative for seizures and syncope  All other systems reviewed and are negative        Current Outpatient Medications on File Prior to Visit   Medication Sig   • Calcium Carb-Cholecalciferol (OSCAL-D) 500 mg-200 units per tablet Take 1 tablet by mouth 2 (two) times a day with meals   • donepezil (ARICEPT) 5 mg tablet TAKE 1 TABLET BY MOUTH AT BEDTIME   • lisinopril (ZESTRIL) 10 mg tablet Take 1 tablet (10 mg total) by mouth daily Objective     /80 (BP Location: Left arm, Patient Position: Sitting, Cuff Size: Standard)   Pulse 63   Temp 98 7 °F (37 1 °C) (Temporal)   Resp 16   Ht 4' 7" (1 397 m)   Wt 54 kg (119 lb)   SpO2 99%   Breastfeeding No   BMI 27 66 kg/m²     Physical Exam  Vitals and nursing note reviewed  Constitutional:       Appearance: She is overweight  HENT:      Head: Normocephalic and atraumatic  Right Ear: External ear normal       Left Ear: External ear normal       Nose: Nose normal       Mouth/Throat:      Mouth: Mucous membranes are moist       Pharynx: Oropharynx is clear  Eyes:      Extraocular Movements: Extraocular movements intact  Conjunctiva/sclera: Conjunctivae normal       Pupils: Pupils are equal, round, and reactive to light  Cardiovascular:      Rate and Rhythm: Normal rate and regular rhythm  Pulses: Normal pulses  Heart sounds: Normal heart sounds  Pulmonary:      Effort: Pulmonary effort is normal       Breath sounds: Normal breath sounds  Abdominal:      General: Bowel sounds are normal       Palpations: Abdomen is soft  Tenderness: There is no abdominal tenderness  Musculoskeletal:         General: Normal range of motion  Cervical back: Normal range of motion  Skin:     General: Skin is warm and dry  Capillary Refill: Capillary refill takes less than 2 seconds  Neurological:      General: No focal deficit present  Mental Status: She is alert and oriented to person, place, and time  Mental status is at baseline  Psychiatric:         Mood and Affect: Mood normal          Behavior: Behavior normal          Thought Content:  Thought content normal        Daryl Bumpers, CRNP

## 2022-12-14 ENCOUNTER — OFFICE VISIT (OUTPATIENT)
Dept: FAMILY MEDICINE CLINIC | Facility: CLINIC | Age: 67
End: 2022-12-14

## 2022-12-14 VITALS
HEART RATE: 63 BPM | DIASTOLIC BLOOD PRESSURE: 80 MMHG | RESPIRATION RATE: 16 BRPM | TEMPERATURE: 98.7 F | BODY MASS INDEX: 27.54 KG/M2 | SYSTOLIC BLOOD PRESSURE: 130 MMHG | OXYGEN SATURATION: 99 % | WEIGHT: 119 LBS | HEIGHT: 55 IN

## 2022-12-14 DIAGNOSIS — I10 PRIMARY HYPERTENSION: Primary | ICD-10-CM

## 2022-12-14 NOTE — ASSESSMENT & PLAN NOTE
BP Readings from Last 3 Encounters:   12/14/22 130/80   11/30/22 142/80   10/31/22 146/80     BP at goal at today's visit with recent addition of lisinopril  Recommend continue lisinopril 10 mg daily  Counseled on importance of following heart healthy diet (limit processed/pre-packaged/fast foods, increase fruits, vegetable, whole grains and lean meats)

## 2022-12-19 ENCOUNTER — TELEPHONE (OUTPATIENT)
Dept: FAMILY MEDICINE CLINIC | Facility: CLINIC | Age: 67
End: 2022-12-19

## 2022-12-19 DIAGNOSIS — M85.89 OSTEOPENIA OF MULTIPLE SITES: ICD-10-CM

## 2022-12-19 RX ORDER — CALCIUM CARBONATE/VITAMIN D3 500MG-5MCG
1 TABLET ORAL 2 TIMES DAILY WITH MEALS
Qty: 180 TABLET | Refills: 1 | Status: SHIPPED | OUTPATIENT
Start: 2022-12-19 | End: 2023-06-19

## 2022-12-19 NOTE — TELEPHONE ENCOUNTER
Left vm requesting refills on:  Calcium Carb-Cholecalciferol (OSCAL-D) 500 mg-200 units per tablet    Please send to pharm on file

## 2023-02-06 ENCOUNTER — PATIENT OUTREACH (OUTPATIENT)
Dept: FAMILY MEDICINE CLINIC | Facility: CLINIC | Age: 68
End: 2023-02-06

## 2023-02-06 DIAGNOSIS — Z74.2 NEED FOR HOME HEALTH CARE: Primary | ICD-10-CM

## 2023-02-06 NOTE — PROGRESS NOTES
GRISELDA CARRANZA had received referral from provider, Ingrid De La Paz regarding the patient having cognitive impairment and son needing assistance with becoming HHA  GRISELDA CARRANZA noted in chart upcoming appointments with geriatric medicine  GRISELDA CARRANZA placed call to the patient, Dony Ortiz utilizing [employer] Amharic interpretor #[id number] and her son, Laura Maldonado answered the phone  He indicated that Mattis mind is not doing well  Laura Maldonado takes care of her and is with her most of the day  His spouse assists as well  Liseth needs assistance with going to the bathroom, preparing meals, dressing (including tying shoes, buttons, etc ), and bathing  He has to be careful that she does not soil herself  He elaborated that she is not incontinent but he has to be careful she does not move the stool  He does not need incontinence supplies  The issue with completing this tasks is not physical ability but that Dony Ortiz has forgotten how to do the tasks He has to select outfits for her and ensure that she is not putting on dirty clothes  She forgets where the bathroom is located and he has to guide her to the bathroom  She forgets she needs to eat but that she will eat when he places the food in front of her with some prompting/cueing  Laura Maldonado will drive her to medical appointments  Dony Ortiz does not own the home, she resides with Laura Maldonado and his spouse  She receives Social Security  Cirilo indicated food and financial stability  He informally manages Liseth's finances  He is her only son  Laura Maldonado will hand Liseth her medications daily at assigned times  He reported that she she does not require assistive devices to ambulate  She is sometimes unsteady but denied any recent falls  Laura Maldonado will stand with Dony Ortiz as she is a risk to wander away if left alone  Cirilo denied need for caregiver support resources as he feels he is managing okay with the support from his spouse  GRISELDA CARRANZA explained the [facility] [place] by [facility] and he is interested in becoming a paid caregiver   Christy Antoine CM did explain role and scope of CMOC  Patient was receptive to a referral to assist with the Waiver application process  SW CM will continue to remain available for psychosocial support as needed

## 2023-02-24 ENCOUNTER — PATIENT OUTREACH (OUTPATIENT)
Dept: FAMILY MEDICINE CLINIC | Facility: CLINIC | Age: 68
End: 2023-02-24

## 2023-02-24 NOTE — PROGRESS NOTES
Outgoing Call  02/24/2023    CMOC called Valerie Ebbs Son, on this day [facility] with [facility] application  [facility] introduced herself and her role  Cirilo agreed services  [facility] scheduled Home Visit on 02/27/2023 to complete Waiver Program referral     [facility] will continue to follow up

## 2023-02-27 ENCOUNTER — PATIENT OUTREACH (OUTPATIENT)
Dept: FAMILY MEDICINE CLINIC | Facility: CLINIC | Age: 68
End: 2023-02-27

## 2023-02-28 NOTE — PROGRESS NOTES
In Person  02/27/2023    [facility] met with Shane Forrest and her son Lor Gusman on this day  CMOC explained Steps of Waiver Program  Also [facility] completed and submitted referral     CMOC explained Cirilo that fisrt interview will be from [address] [place] [address], phone call should be received withi one week  Cirilo expressed understanding  [facility] explained Criilo that I will be out of the office from 03/01 to 03/13  Cirilo expressed understanding  [facility] will continue to follow up  Next folow up was scheduled on 03/13/2023

## 2023-03-01 DIAGNOSIS — I10 PRIMARY HYPERTENSION: ICD-10-CM

## 2023-03-01 RX ORDER — LISINOPRIL 10 MG/1
TABLET ORAL
Qty: 90 TABLET | Refills: 0 | Status: SHIPPED | OUTPATIENT
Start: 2023-03-01

## 2023-03-13 ENCOUNTER — PATIENT OUTREACH (OUTPATIENT)
Dept: FAMILY MEDICINE CLINIC | Facility: CLINIC | Age: 68
End: 2023-03-13

## 2023-03-13 NOTE — PROGRESS NOTES
GRISELDA CM was updated by [facility] that the IEB has not received the physician certification form  GRISELDA CM placed new form in provider's mailbox for completion  GRISELDA CM will continue to remain available for psychosocial support as needed

## 2023-03-14 ENCOUNTER — PATIENT OUTREACH (OUTPATIENT)
Dept: FAMILY MEDICINE CLINIC | Facility: CLINIC | Age: 68
End: 2023-03-14

## 2023-03-15 NOTE — PROGRESS NOTES
Outgoing Call  03/14/2023    CMOC called IEB to follow up with Waiver Program application  Eliud from [facility] [place] Reina Abreu stated that first interview with IEB was completed and Sabino Hurley still needs Physician Certification Form  [address] asked Mayur Aparicio to assist with Physician Certification Form  Kemal Hastings will do  CMOC called Cirilo, Liseth's Son, and explained that Aging Office will call anytime within one week to schedule assessment  Also that Certification will be complete and send to IEB  Cirilo expressed understanding  Teagan Leslie Saul will continue to follow up  Next follow up was scheduled on 03/21/2023

## 2023-03-21 ENCOUNTER — PATIENT OUTREACH (OUTPATIENT)
Dept: FAMILY MEDICINE CLINIC | Facility: CLINIC | Age: 68
End: 2023-03-21

## 2023-03-23 NOTE — PROGRESS NOTES
Outgoing Call  03/21/2023    [facility] called Debbi Kuo, Liseth's Son, on this day to follow up with second interview with Aging Office for [facility]     Debbi Kuo stated that Aging Office will do Home Visit on Friday the 24th  [facility] explained Cirilo that after Aging Office complete assessment he will received letter from [address] [place] [address] requesting documents to complete financial part of the application  Debbi Kuo was very thankful and expressed understanding  [facility] will continue to follow up  Next follow up was scheduled on 03/28/2023

## 2023-03-28 ENCOUNTER — PATIENT OUTREACH (OUTPATIENT)
Dept: FAMILY MEDICINE CLINIC | Facility: CLINIC | Age: 68
End: 2023-03-28

## 2023-03-29 NOTE — PROGRESS NOTES
Incoming Call  03/28/2023    Lb Reyes received phone call from Carrie Clarke Son, on this day regarding letter received from [facility]/  Lb Reyes asked Cirilo to send pictures  [facility] received pictures and explained Cirilo that DHS is requesting documents to process financial part of the [facility] application  Cirilo expressed understanding  Óscartai Amy explained that she needs 5 years bank statements, copy of life insurance, monthly expensive, Social Security Award letter and others  Cirilo expressed understanding and will start getting together paperwork  Lb Reyes will continue to follow up  Next follow up was scheduled on 04/04/2023

## 2023-04-03 ENCOUNTER — PATIENT OUTREACH (OUTPATIENT)
Dept: FAMILY MEDICINE CLINIC | Facility: CLINIC | Age: 68
End: 2023-04-03

## 2023-04-04 NOTE — PROGRESS NOTES
Incoming Call  04/03/2023    [facility] received call from Kg Clarke Son, on this day  Vince was having problems uploading bank statements to compass website  CMOC suggested to send paperwork via post office  Address was provided via text/picture message  Cirilo expressed understanding and will do  [facility] will continue to follow up  Ext follow up was scheduled on 04/10/2023

## 2023-04-28 ENCOUNTER — PATIENT OUTREACH (OUTPATIENT)
Dept: FAMILY MEDICINE CLINIC | Facility: CLINIC | Age: 68
End: 2023-04-28

## 2023-04-28 NOTE — PROGRESS NOTES
Outgoing Call  04/28/2023    CMOC called Key Becker on this day regarding Waiver Program/Financial Part  Natalie Costa stated that he called multiple of times and Ms Justo Garner had not return calls  CMOC called ANUJA Greenfield from [address] [place] [address] suggested to call [facility] and follow up with  due paperwork is on the system since 04/11/2023  CMOC will do  [facility] sent email to Ms Justo Garner requesting reconsideration  [facility] will continue to follow up  Next follow up was scheduled on 05/01/2023

## 2023-05-01 ENCOUNTER — PATIENT OUTREACH (OUTPATIENT)
Dept: FAMILY MEDICINE CLINIC | Facility: CLINIC | Age: 68
End: 2023-05-01

## 2023-05-02 NOTE — PROGRESS NOTES
Chart Review  05/01/2023    [facility] sent email to Ms Edgard Reynolds, supervisor of caseworkers at [employer]  CMOC explained Ms Edgard Reynolds that Ms Thao Oliver had not returned calls to [facility]  Also OC explained that documents requested were placed at [facility]/ more than three weeks ago and I would like to follow up with Ms Thao Oliver  No answers at this time  [facility] will continue to follow up  Next follow up was scheduled on 05/04/2023

## 2023-05-04 ENCOUNTER — PATIENT OUTREACH (OUTPATIENT)
Dept: FAMILY MEDICINE CLINIC | Facility: CLINIC | Age: 68
End: 2023-05-04

## 2023-05-05 NOTE — PROGRESS NOTES
Outgoing Call  [phone]    [facility] sent email to Ms Gunner Hernadez from [facility] on this day to follow up with Liseth's reconsideration  No answer had been received yet  [facility] will continue to follow up  CMOC called Cirilo, Liseth's Son  CMOC explained Cirilo that we just need to get in contact with Ms Gunner Hernadez in order reconsideration is process  Also CMOC explained Cirilo that I had to contact her by phone, emails and even thru her supervisor but Ms Gunner Hernadez had not confirmed if application will be reconsider  Cirilo expressed understanding  Also CMOC encouraged Cirilo to keep calling Ms Gunner Hernadez daily and leave voicemail every day until we get in contact with her  Bassam caraballo do  [facility] will continue to follow up  Next follow up was scheduled on 05/11/2023

## 2023-05-11 ENCOUNTER — PATIENT OUTREACH (OUTPATIENT)
Dept: FAMILY MEDICINE CLINIC | Facility: CLINIC | Age: 68
End: 2023-05-11

## 2023-05-11 NOTE — PROGRESS NOTES
Outgoing Call  2023    CMOC called IEB to follow up with Waiver Program approval     Alina from [address] [facility] Reina Abreu stated that application still closed due [place] did not received paperwok requested  CMOC explained that Velma Ulloa was returned in and [facility] sent email to Ms [facility] requesting reconsideration  Alina suggested to contact Ms [facility] again due application will  on 2023  CMOC will do  [facility] sent email to Ms [facility] explaining the above  No answer was received yet  [facility] will continue to follow up  [facility] sent text message/voice to Lianet Becker  CMOC explained Cirilo that Ms [facility] have not reconsidered application yet  Also CMOC explained Cirilo that Ms Angulo Lizeth, Supervisor, was CC on the email  No answer received yet from Vince  [facility] will continue to follow up  Next follow was scheduled on 05/15/2023

## 2023-05-15 ENCOUNTER — PATIENT OUTREACH (OUTPATIENT)
Dept: FAMILY MEDICINE CLINIC | Facility: CLINIC | Age: 68
End: 2023-05-15

## 2023-05-15 NOTE — PROGRESS NOTES
Outgoing Call  05/15/2023    [facility] called Miguel Becker, on this day regarding Financial Part/Waiver Program     Stas Bravo did not answer at this time  [facility] left voicemail to please call back  [facility] had not received any answers from Ms Paul/LISSETH regarding reconsideration  [facility] will continue to follow up  Next folow up was scheduled on 05/18/2023

## 2023-05-16 ENCOUNTER — OFFICE VISIT (OUTPATIENT)
Age: 68
End: 2023-05-16

## 2023-05-16 VITALS
TEMPERATURE: 98 F | BODY MASS INDEX: 26.77 KG/M2 | WEIGHT: 119 LBS | HEIGHT: 56 IN | OXYGEN SATURATION: 100 % | SYSTOLIC BLOOD PRESSURE: 118 MMHG | HEART RATE: 69 BPM | DIASTOLIC BLOOD PRESSURE: 52 MMHG

## 2023-05-16 DIAGNOSIS — E66.3 OVERWEIGHT (BMI 25.0-29.9): ICD-10-CM

## 2023-05-16 DIAGNOSIS — F02.B11 MODERATE LATE ONSET ALZHEIMER'S DEMENTIA WITH AGITATION (HCC): Primary | ICD-10-CM

## 2023-05-16 DIAGNOSIS — K59.01 SLOW TRANSIT CONSTIPATION: ICD-10-CM

## 2023-05-16 DIAGNOSIS — G30.1 MODERATE LATE ONSET ALZHEIMER'S DEMENTIA WITH AGITATION (HCC): Primary | ICD-10-CM

## 2023-05-16 DIAGNOSIS — I10 PRIMARY HYPERTENSION: ICD-10-CM

## 2023-05-16 DIAGNOSIS — M85.89 OSTEOPENIA OF MULTIPLE SITES: ICD-10-CM

## 2023-05-16 RX ORDER — SENNOSIDES 8.6 MG
8.6 TABLET ORAL DAILY
Qty: 90 TABLET | Refills: 3 | Status: SHIPPED | OUTPATIENT
Start: 2023-05-16

## 2023-05-16 NOTE — PROGRESS NOTES
Cesia Coley [facility]  [address], [facility], [address] [place] [address]  [phone]    Social Work Intake    Maris Dyer presents today for a geriatric assessment, accompanied by son-Cirilo  LSW attempted the [facility] 8 1 utilizing   LSW had to repeat instruction multiple times with  repeating  Patient stated she did not understand, and declined to continue to complete the assessment  LSW met with patient/son utilizing [address]   Son stated that he and his wife are patient's primary caregivers   LSW provided the following resources/eduction:    - Packet with Alzheimer's Association information including: definition of dementia/alzhiemer's disease, communication tips for different stages, common behaviors and response strategies (in [place]ym[place])    Caregiver Support  - Alzheimer's Association Rx Pad (guide to website and 24/7 helpline, 9-256.906.8194) (in [address])    [facility] brochure (explained they have [place] speaking staff, about $70 per day or waiver will cover if they are approved)    Other  - Information on [employer] (Steps to Apply for [employer] with [place] contact information and the [address] [place] [address] and Support Pending Documentation checklist)   ([facility] assisting son with contacting [place] to help with obtaining waiver services)

## 2023-05-16 NOTE — PROGRESS NOTES
ASSESSMENT AND PLAN:  1  Moderate late onset Alzheimer's dementia with agitation ([facility] [address] )  Assessment & Plan:  Considering nightmares, stop donepezil therapy  Decision-making capacity: Does not have capacity for financial or medical decisions    Staging: Moderate-Severe stage Alzheimer's dementia (FAST stage 6c)    Medications Review: Reviewed medications  We will stop donepezil considering     Orders:  -     Ambulatory Referral to [facility]    2  Slow transit constipation  Assessment & Plan:  Start senna 8 6 mg daily  Orders:  -     senna (SENOKOT) 8 6 mg; Take 1 tablet (8 6 mg total) by mouth daily    3  Primary hypertension  Assessment & Plan:  Appears stable  Continue lisinopril  Would have low threshold to stop therapy as recently well controlled with increasing frailty  4  Overweight (BMI 25 0-29  9)  Assessment & Plan:  Patient at healthy weight for her age and functional status  5  Osteopenia of multiple sites  Assessment & Plan:  Continue Ca/Vit D therapy  HPI:    We had the pleasure of evaluating Vannesa Garrido who is a 69 y o  female in Geriatric consultation today  Ms Mouna Staples is in the office with her son Stas Bravo  She lives with her son Stas Bravo  HISTORY AS PER SON UDAY:  He reports memory has been getting worse little by little, particularly in the last 6 months  Issue started gradually  Now can't find things in the house  Looses things  At times gets lost in the house and can't find the bathroom  At times doesn't recognize the house, gets angry that she is in the wrong place      COGNITION:  Difficulty finding the right word while speaking: No  Requires repeat information or asking the same question repeatedly: Yes  Changes in mood or personality:No  Current or previous treatment for depression or anxiety: No    Family member with dementia and what type? no  History of head trauma: No  History of stroke: No  History of alcohol or substance abuse: No    FUNCTIONAL STATUS:  BADLs  Does patient require assistance with:  Bathing: Yes, needs help with balance  Dressing: Yes  Transferring: No  Continence: No  Toileting: Yes  At times patient with constipation, attempts self-stimulation  Feeding: No     IADLs  Dose patient require assistance with:  Telephone: Yes  Shopping: Yes  Food Preparation: Yes  Housekeeping: Yes  Laundry: Yes  Transportation: Yes  Medications: Yes  Finances: Yes    NEUROPSYCH SYMPTOMS:  Does patient get angry or hostile? Resist care from others? Yes , at times  Does patient see or hear things that no one else can see or hear? No  Does patient act impatient and cranky? Does mood frequently change for no apparent reason? No  Does patient act suspicious without good reason (example: believes that others are stealing from him or her, or planning to harm him or her in some way)? No  Does patient less interested in his or her usual activities or in the activities and plans of others? Yes  Does patient have trouble sleeping at night? Yes, at times, will be awake in the night walking around a searching for things      SAFETY:  Hearing and vision issue: No  Any gait or balance disorder: Yes, at times difficulty with balance  Uses: no assistive devices  Any falls in the last year: No  Any history of wandering: No  Are there firearms or guns in the home: No  Does patient drive: No  Any driving accidents or citations in the last year: No  Any concerns about patient's ability to drive: Yes    ACP REVIEW:  Does patient have POA: No  Does patient have a Living will: No  Any legal assistance needed for healthcare planning?: Yes    Allergies   Allergen Reactions   • Donepezil Other (See Comments)     nightmares       Medications:    Current Outpatient Medications on File Prior to Visit   Medication Sig Dispense Refill   • Calcium Carb-Cholecalciferol (Calcium 500 + D) 500-5 MG-MCG TABS Take 1 tablet by mouth 2 (two) times a day with meals 180 tablet 1   • lisinopril "(ZESTRIL) 10 mg tablet TAKE 1 TABLET BY MOUTH EVERY DAY 90 tablet 0   • [DISCONTINUED] donepezil (ARICEPT) 5 mg tablet TAKE 1 TABLET BY MOUTH AT BEDTIME 90 tablet 1     No current facility-administered medications on file prior to visit  History:  Past Medical History:   Diagnosis Date   • No known health problems      Past Surgical History:   Procedure Laterality Date   • APPENDECTOMY  2010     Family History   Problem Relation Age of Onset   • Breast cancer Neg Hx    • Colon cancer Neg Hx    • Ovarian cancer Neg Hx    • Cancer Neg Hx      Social History     Socioeconomic History   • Marital status:      Spouse name: Not on file   • Number of children: Not on file   • Years of education: Not on file   • Highest education level: Not on file   Occupational History   • Not on file   Tobacco Use   • Smoking status: Never   • Smokeless tobacco: Never   Vaping Use   • Vaping Use: Never used   Substance and Sexual Activity   • Alcohol use: Never   • Drug use: Never   • Sexual activity: Not Currently     Birth control/protection: Post-menopausal   Other Topics Concern   • Not on file   Social History Narrative   • Not on file     Social Determinants of Health     Financial Resource Strain: Not on file   Food Insecurity: Not on file   Transportation Needs: Not on file   Physical Activity: Not on file   Stress: Not on file   Social Connections: Not on file   Intimate Partner Violence: Not on file   Housing Stability: Not on file     Past Surgical History:   Procedure Laterality Date   • APPENDECTOMY  2010       OBJECTIVE:  Vitals:    05/16/23 1112   BP: 118/52   BP Location: Left arm   Patient Position: Sitting   Cuff Size: Standard   Pulse: 69   Temp: 98 °F (36 7 °C)   TempSrc: Temporal   SpO2: 100%   Weight: 54 kg (119 lb)   Height: 4' 7 91\" (1 42 m)     Body mass index is 26 77 kg/m²  Physical Exam    MoCA: 4/29 (however, unable to complete    Patient declined to continue)      Labs & Imaging:  Lab Results " Component Value Date    WBC 7 03 07/14/2022    HGB 14 4 07/14/2022    HCT 45 6 07/14/2022    MCV 95 07/14/2022     07/14/2022     Lab Results   Component Value Date    SODIUM 140 07/14/2022    K 4 2 07/14/2022     (H) 07/14/2022    CO2 30 07/14/2022    AGAP 1 (L) 07/14/2022    BUN 14 07/14/2022    CREATININE 0 76 07/14/2022    GLUF 82 07/14/2022    CALCIUM 9 5 07/14/2022    AST 18 07/14/2022    ALT 19 07/14/2022    ALKPHOS 101 07/14/2022    TP 7 7 07/14/2022    TBILI 0 66 07/14/2022    EGFR 80 07/14/2022     Lab Results   Component Value Date    HGBA1C 5 6 07/14/2022     Lab Results   Component Value Date    CHOLESTEROL 216 (H) 07/14/2022     Lab Results   Component Value Date    HDL 61 07/14/2022     Lab Results   Component Value Date    TRIG 102 07/14/2022     Lab Results   Component Value Date    NONHDLC 155 07/14/2022     Lab Results   Component Value Date    LDLCALC 135 (H) 07/14/2022     Lab Results   Component Value Date    BHJMYGPY10 524 07/14/2022     Lab Results   Component Value Date    MYB0IABWBGXR 1 480 07/14/2022     Lab Results   Component Value Date    RPR Non-Reactive 07/14/2022       Results for orders placed during the hospital encounter of 11/11/22    MRI brain wo contrast    Narrative  MRI BRAIN WITHOUT CONTRAST    INDICATION: R41 3: Other amnesia  R41 89: Other symptoms and signs involving cognitive functions and awareness  COMPARISON:   None  TECHNIQUE:  Multiplanar, multisequence imaging of the brain was performed  IMAGE QUALITY:  Diagnostic  FINDINGS:    BRAIN PARENCHYMA: Mild cerebral volume loss  There is no discrete mass, mass effect or midline shift  There is no intracranial hemorrhage  Diffusion imaging is unremarkable  Very minimal nonspecific white matter change suggesting microangiopathy  VENTRICLES:  Normal for the patient's age  SELLA AND PITUITARY GLAND:  Normal     ORBITS:  Prior bilateral cataract surgery      PARANASAL SINUSES:  Minimal ethmoidal air cells mucosal thickening  VASCULATURE:  Evaluation of the major intracranial vasculature demonstrates appropriate flow voids  CALVARIUM AND SKULL BASE:  Normal     EXTRACRANIAL SOFT TISSUES:  Normal     Impression  Mild cerebral volume loss  Consider quantitative volumetric brain MRI (NEUROQUANT), if mesial temporal lobe focused neurodegenerative process is concerned  I have spent 65 minutes with Patient and family today including taking history from family, patient, review of records, charting, and counseling regarding diagnosis and management of conditions

## 2023-05-16 NOTE — ASSESSMENT & PLAN NOTE
Continue Ca/Vit D therapy  Pt was complaining of severe uterine cramping while breastfeeding.  Pt has been taking Tylenol and ibuprofen every 4-6 hours.  Warm packs help slightly. Dr. Lynn was called and orders were received for Oxycodone prn.  Pt was also complaining of tender and reddened nipples. She has been using lanolin. Nurse has been checking latch. Baby sucks vigorously. Nipple shield and hydrogel given.  Instructed on how to use hydrogel.

## 2023-05-16 NOTE — ASSESSMENT & PLAN NOTE
Considering nightmares, stop donepezil therapy  Decision-making capacity: Does not have capacity for financial or medical decisions    Staging: Moderate-Severe stage Alzheimer's dementia (FAST stage 6c)    Medications Review: Reviewed medications    We will stop donepezil considering

## 2023-05-16 NOTE — ASSESSMENT & PLAN NOTE
Appears stable  Continue lisinopril  Would have low threshold to stop therapy as recently well controlled with increasing frailty

## 2023-05-19 ENCOUNTER — PATIENT OUTREACH (OUTPATIENT)
Dept: FAMILY MEDICINE CLINIC | Facility: CLINIC | Age: 68
End: 2023-05-19

## 2023-05-19 NOTE — PROGRESS NOTES
Outgoing Call  05/19/2023    CMOC called IEB to follow up with Waiver Program application  Gin from [address] [place] [address] Reina Triplett stated that Liseth needs new PA-600  [facility] scheduled new interview on 05/22/2023 between 10-11am with [facility]  CMOC called Cirilo  [facility] explained Cirilo that new PA-600 needs to be completed and phone call was scheduled on 05/22/2023  Cirilo expressed unerstanding  Also CMOC explained Cirilo that a soon interview is completed [facility] will follow up with Ms Holli Campoverde for reconsideration  Cirilo expressed understanding  [facility] will continue to follow up  Next follow up was scheduled on 05/24/2023

## 2023-05-23 ENCOUNTER — PATIENT OUTREACH (OUTPATIENT)
Dept: FAMILY MEDICINE CLINIC | Facility: CLINIC | Age: 68
End: 2023-05-23

## 2023-05-24 ENCOUNTER — PATIENT OUTREACH (OUTPATIENT)
Dept: FAMILY MEDICINE CLINIC | Facility: CLINIC | Age: 68
End: 2023-05-24

## 2023-05-24 NOTE — PROGRESS NOTES
Incoming Call  05/23/2023    [address] received phone call from [place], [place] Son, on this day regarding Waiver Program approval     [address] explained Vince that new PA-600 was completed yeaterday and will take around 5 days in order to [facility] receive it and letter will be received regarding financial part approval  Also I explained Edmar Luevano that I will continue to follow up with [facility]  Cirilo expressed understanding  Jacquelin Leslie Ross Kg will continue to follow up

## 2023-05-24 NOTE — PROGRESS NOTES
[facility]  [address] [facility], [address] [place] [address]  [phone]    Care Conference    NAME: Lien Griggs  AGE: 77 y o  SEX: female  YOB: 1955  DATE OF ASSESSMENT: 5/16/23  DATE OF CONFERENCE: 5/30/23    Family Present: Myke Nguyen  Staff Present: Dr Irene Toribio    Patient / Family Goals of Care: Review cognitive decline and associated symptoms, discuss treatment options and care planning  Medical Concerns (Current/Historical): slow transit constipation, primary hypertension, overweight (BMI 25 0-29 9), osteopenia of multiple sites     Geriatric Syndromes/Age Related Syndromes: Moderate late onset Alzheimer's dementia with agitation ([address] )    Neuropsychological  • Moderate late onset Alzheimer's dementia with agitation ([address] )  o Considering nightmares, stop donepezil therapy   o [place] Cognitive Assessment: unable to complete     · Decision-making capacity: Does not have capacity for financial or medical decisions  · Staging: Moderate-Severe stage Alzheimer's dementia (FAST stage 6c)     • Remain active physically, mentally and socially  • Pharmaceutical and non-pharmaceutical interventions discussed  • Engage in cognitively challenging exercises such as crosswords and puzzles  • Maintain chronic conditions under control  • Repeat cognitive assessment in 6 months    Diagnostic Studies  • Review of bloodwork  • Review of MRI brain wo contrast (11/11/22)  o Impression: Mild cerebral volume loss  Consider quantitative volumetric brain MRI (NEUROQUANT), if mesial temporal lobe focused neurodegenerative process is concerned      Physical Finding Impacting Function   Fall Risk: moderate fall risk   Activities of Daily Living: Independent with transferring, continence, and feeding; dependent with bathing, dressing, and toileting    Instrumental Activities of Daily Living: Dependent    • Encourage appropriate footwear at all times  • Review fall risk prevention tips and adjust within the home environment as needed    Medications Reviewed   • Reviewed medications  We will stop donepezil considering nightmares  • Check with PCP before using over the counter medications  • Avoid over the counter medications that can affect cognition (e g , Benadryl, Tylenol PM)   • Avoid NSAIDs due to risk of GI bleed and renal impairment    Other Findings   Overall health  • BMI: 26 77 kg/m2  • Maintain well-balanced diet  • Continue following with primary care physician regularly  Slow transit constipation  • Start senna 8 6 mg daily  Primary hypertension   • Appears stable  Reduce lisinopril to 5 mg daily  Would have low threshold to stop therapy as recently well controlled with increasing frailty  Overweight (BMI 25 0-29  9)  • Patient at healthy weight for her age and functional status  Osteopenia of multiple sites  • Continue Calcium/Vitamin D therapy      Recommended Health Maintenance   Immunizations, if not contraindicated:   • Influenza vaccine yearly  • Pneumo vaccine every 5 years (65 years and over)  • Shingles vaccine  • COVID-19 vaccine    Social / Safety Concerns  • Consider an Adult Day Program for positive socialization, physical exercise, cognitive stimulation and family respite  • Continue to work with  for [facility]  • Stay in touch with family and friends  • Plan self-care activities for your mental well-being each week  • Recommend review of fall risk prevention tips  • Recommend use of fall precautions including fall alert device  • For wandering prevention, consider use of fall alert with GPS, SafeReturn bracelet, Project LifeSaver bracelet, video surveillance, alarm systems, Tile or Airtags   • Consider assistance for medication administration such as blister packaging or use of an automated pill dispenser  • Recommend contacting your local [address] on Aging for possible eligible programs such as [employer], [address] [place] [address] [email or web address], or [address] Issues  • Maintain updated advance directives and provide a copy to your primary care provider  • Consider caregiver support groups and educational resources through the Alzheimer's Association; access Alzheimer's Association 24/7 Helpline at 2-540.416.2156  ? [facility] does offer a monthly caregiver support group  If interested, please speak with a   • Utilize reorientation and redirection as needed (dependent on situation)  • Educational information provided  • Recommended literature: 36 Hour Day, Untangling Alzheimer's, Learning to Speak Alzheimer's    Patient and family verbalized understanding of above care plan  For care coordination purposes, this care plan will be shared with your primary care provider  With any questions, please contact our office at [phone]

## 2023-05-24 NOTE — PROGRESS NOTES
Outgoing Email  05/24/2023    [facility] sent email to Ms Michael Gardner from [facility] regarding new PA-600  [facility] explained Ms Michael Gardner that new PA-600 was completed and asked to process application [facility] will continue to follow up  Next follow up was scheduled on 05/29/2023

## 2023-05-31 ENCOUNTER — PATIENT OUTREACH (OUTPATIENT)
Dept: FAMILY MEDICINE CLINIC | Facility: CLINIC | Age: 68
End: 2023-05-31

## 2023-05-31 ENCOUNTER — OFFICE VISIT (OUTPATIENT)
Age: 68
End: 2023-05-31

## 2023-05-31 VITALS
HEART RATE: 63 BPM | SYSTOLIC BLOOD PRESSURE: 114 MMHG | WEIGHT: 117.6 LBS | DIASTOLIC BLOOD PRESSURE: 62 MMHG | TEMPERATURE: 97.1 F | HEIGHT: 56 IN | BODY MASS INDEX: 26.45 KG/M2 | OXYGEN SATURATION: 98 %

## 2023-05-31 DIAGNOSIS — M85.89 OSTEOPENIA OF MULTIPLE SITES: ICD-10-CM

## 2023-05-31 DIAGNOSIS — G30.1 MODERATE LATE ONSET ALZHEIMER'S DEMENTIA WITH AGITATION (HCC): Primary | Chronic | ICD-10-CM

## 2023-05-31 DIAGNOSIS — K59.01 SLOW TRANSIT CONSTIPATION: ICD-10-CM

## 2023-05-31 DIAGNOSIS — I10 PRIMARY HYPERTENSION: ICD-10-CM

## 2023-05-31 DIAGNOSIS — E66.3 OVERWEIGHT (BMI 25.0-29.9): ICD-10-CM

## 2023-05-31 DIAGNOSIS — F02.B11 MODERATE LATE ONSET ALZHEIMER'S DEMENTIA WITH AGITATION (HCC): Primary | Chronic | ICD-10-CM

## 2023-05-31 RX ORDER — DONEPEZIL HYDROCHLORIDE 5 MG/1
5 TABLET, FILM COATED ORAL
COMMUNITY
End: 2023-05-31

## 2023-05-31 RX ORDER — LISINOPRIL 5 MG/1
5 TABLET ORAL DAILY
Qty: 90 TABLET | Refills: 3 | Status: SHIPPED | OUTPATIENT
Start: 2023-05-31

## 2023-05-31 NOTE — PATIENT INSTRUCTIONS
[facility]  [address] [facility], [address] [facility], [address] [place] [address]  [phone]    Care Conference    NAME: Madelaine Matthew  AGE: 77 y o  SEX: female  YOB: 1955  DATE OF ASSESSMENT: 5/16/23  DATE OF CONFERENCE: 5/30/23    Family Present: Tai Scott  Staff Present: Dr Mayito Barcenas    Patient / Family Goals of Care: Review cognitive decline and associated symptoms, discuss treatment options and care planning  Medical Concerns (Current/Historical): slow transit constipation, primary hypertension, overweight (BMI 25 0-29 9), osteopenia of multiple sites     Geriatric Syndromes/Age Related Syndromes: Moderate late onset Alzheimer's dementia with agitation ([address] )    Neuropsychological  Moderate late onset Alzheimer's dementia with agitation ([address] )  Considering nightmares, stop donepezil therapy  [place] Cognitive Assessment: unable to complete     Decision-making capacity: Does not have capacity for financial or medical decisions  Staging: Moderate-Severe stage Alzheimer's dementia (FAST stage 6c)     Remain active physically, mentally and socially  Pharmaceutical and non-pharmaceutical interventions discussed  Engage in cognitively challenging exercises such as crosswords and puzzles  Maintain chronic conditions under control  Repeat cognitive assessment in 6 months    Diagnostic Studies  Review of bloodwork  Review of MRI brain wo contrast (11/11/22)  Impression: Mild cerebral volume loss  Consider quantitative volumetric brain MRI (NEUROQUANT), if mesial temporal lobe focused neurodegenerative process is concerned      Physical Finding Impacting Function   Fall Risk: moderate fall risk   Activities of Daily Living: Independent with transferring, continence, and feeding; dependent with bathing, dressing, and toileting    Instrumental Activities of Daily Living: Dependent    Encourage appropriate footwear at all times  Review fall risk prevention tips and adjust within the home environment as needed    Medications Reviewed   Reviewed medications  We will stop donepezil considering nightmares  Check with PCP before using over the counter medications  Avoid over the counter medications that can affect cognition (e g , Benadryl, Tylenol PM)   Avoid NSAIDs due to risk of GI bleed and renal impairment    Other Findings   Overall health  BMI: 26 77 kg/m2  Maintain well-balanced diet  Continue following with primary care physician regularly  Slow transit constipation  Start senna 8 6 mg daily  Primary hypertension   Appears stable  Reduce lisinopril to 5 mg daily  Would have low threshold to stop therapy as recently well controlled with increasing frailty  Overweight (BMI 25 0-29  9)  Patient at healthy weight for her age and functional status  Osteopenia of multiple sites  Continue Calcium/Vitamin D therapy  Recommended Health Maintenance   Immunizations, if not contraindicated:    Influenza vaccine yearly  Pneumo vaccine every 5 years (65 years and over)  Shingles vaccine  COVID-19 vaccine    Social / Safety Concerns  Consider an Adult Day Program for positive socialization, physical exercise, cognitive stimulation and family respite  Continue to work with [facility]  Stay in touch with family and friends  Plan self-care activities for your mental well-being each week  Recommend review of fall risk prevention tips  Recommend use of fall precautions including fall alert device  For wandering prevention, consider use of fall alert with GPS, DiversityDoctoreturn bracelet, Project LifeSaver bracelet, video surveillance, alarm systems, Tile or Airtags   Consider assistance for medication administration such as blister packaging or use of an automated pill dispenser  Recommend contacting your local [address] on Aging for possible eligible programs such as OPTIONS, Caregiver Support Program, or [address] [place] [address] updated advance directives and provide a copy to your primary care provider  Consider caregiver support groups and educational resources through the Alzheimer's Association; access Alzheimer's Association 24/7 Helpline at [facility] does offer a monthly caregiver support group  If interested, please speak with a   Utilize reorientation and redirection as needed (dependent on situation)  Educational information provided  Recommended literature: 36 Hour Day, Untangling Alzheimer's, Learning to Speak Alzheimer's    Patient and family verbalized understanding of above care plan  For care coordination purposes, this care plan will be shared with your primary care provider  With any questions, please contact our office at [phone]

## 2023-05-31 NOTE — PROGRESS NOTES
"[facility]  [address], [address] [facility], [address] [place] [address]  [phone]    Care Conference: Resources Provided    LSW participated in today's conference and provided the following resources, along with a copy of care plan:  [place] [address] [place] [phone]    General Information  - 10 Ways to Love Your Brain  - Memory loss ladder  - Packet with Alzheimer's Association information including: definition of dementia, communication tips for different stages, common behaviors and response strategies (Maltese version)  - NIH SUDHIR \"Now What?  Next Steps After an Alzheimer's Diagnosis\" (Maltese version)    Caregiver Support  - Flyer for [address] (meeting 2x per month by [employer])  - Alzheimer's Association Rx Pad (guide to website and 24/7 helpline, 3-400.109.1596) (Maltese version)    Safety  - CDC fall prevention / home safety checklist  - Lifeline fall alert systems packet    [address] [facility] in [address] [place] [address] contains information on home care agencies, adult day centers, higher levels of care, and more  - [address] [place] [address] on [employer] (Steps to Apply for [employer] with [place] IEB contact information and the [address] [place] [address] and Support Pending Documentation checklist)   - OPTIONS Program information  "

## 2023-05-31 NOTE — PROGRESS NOTES
Outgoing Call  05/31/2023    [facility] called IEB on this day to follow up Waiver Program approval     Isaura Gutierrez from [address] [place] [address] stated that application still in process by the [place] and suggested to call  to follow up  [facility] sent email to Ms Mariano Linda from [facility] asking for updates of application  No answer have been received yet  [facility] will continue to follow up  Next follow up was scheduled on 06/02/2023

## 2023-06-01 ENCOUNTER — PATIENT OUTREACH (OUTPATIENT)
Dept: FAMILY MEDICINE CLINIC | Facility: CLINIC | Age: 68
End: 2023-06-01

## 2023-06-01 NOTE — PROGRESS NOTES
Incoming Call  06/01/2023    [address] [place] [address] received phone call from Vince, Aleksey Son, on this day  Vince is concerned regarding Waiver Program approval  [address] [place] [address] explained Cirilo that I have been following up with IE[employer] and Ms Hardy Justin the  from [employer]; and we only need Ms Antony Anderson to approved application  CMOC called Ms Remedios Jolley, Supervisor of Ms Hardy Justin, along with Vince ([employer]way)  Ms Remedios Jolley did not answer at this time  [address] [place] [address] left voicemail to please call back regarding Liseth's application  CMOC explained Cirilo that as soon Ms Remedios Jolley calls back I will provide updates on application  Cirilo expressed understanding  [address] [place] [address] will continue to follow up  Next follow up was scheduled on 06/05/2023

## 2023-06-02 ENCOUNTER — PATIENT OUTREACH (OUTPATIENT)
Dept: FAMILY MEDICINE CLINIC | Facility: CLINIC | Age: 68
End: 2023-06-02

## 2023-06-02 NOTE — PROGRESS NOTES
Incoming Call  06/02/2023    [facility] received phone call from Carrie Clarke Son  Cirilo received letter from [facility] that he needs to send via fax monthly expenses, April and May Bank Statements  Cirilo expressed understanding and will fax it Ms Jhon Hammond who is new  working with application  Fax number was provided  CMOC called [employer] along with Cirilo (3way)  Rochelle Paige from [address] [place] [address] confirmed that all documents returned in to [facility] on 04/11 were scanned in the system  Also explained that [employer] was scanned under Income  [facility] suggested Vince to call Ms Alexys Grant or write statement regarding burial insurance scanned under income  Vince will write statement  [facility] will continue to follow up  Next follow up was scheduled on 06/09/2023

## 2023-06-08 ENCOUNTER — PATIENT OUTREACH (OUTPATIENT)
Dept: FAMILY MEDICINE CLINIC | Facility: CLINIC | Age: 68
End: 2023-06-08

## 2023-06-08 NOTE — PROGRESS NOTES
Outgoing Call  06/08/2023    [address] [place] [address] called Ms Bell Bossman,   from [facility], to follow up with Waiver Approval     Ms Juanjo Castrojenni stated that application was approved  CMOC called Cirilo, Liseth's Son, CMOC explained Cirilo that [employer] will call to schedule assessmment  Mojgan Delcid was very thankful for [address] [place] [address] assistance  [address] [place] [address] will continue to follow up  Next follow up was scheduled on 06/16/2023

## 2023-06-12 ENCOUNTER — PATIENT OUTREACH (OUTPATIENT)
Dept: FAMILY MEDICINE CLINIC | Facility: CLINIC | Age: 68
End: 2023-06-12

## 2023-06-12 NOTE — PROGRESS NOTES
Incoming Call  06/12/2023    [address] received phone call from Carrie Clarke Son  Vince stated that  completed assessment today and by Friday will call [facility] for new updates   is Soledad Durant from [facility] and phone number is [phone]  [facility] explained Cirilo that [address] will be calling him as soon authorization comes in  Cirilo expressed understanding  Jacquelin Saul will continue to follow up  Next follow up was scheduled on 06/19/2023

## 2023-06-14 ENCOUNTER — OFFICE VISIT (OUTPATIENT)
Dept: FAMILY MEDICINE CLINIC | Facility: CLINIC | Age: 68
End: 2023-06-14

## 2023-06-14 VITALS
HEIGHT: 56 IN | WEIGHT: 119 LBS | BODY MASS INDEX: 26.77 KG/M2 | TEMPERATURE: 98.7 F | DIASTOLIC BLOOD PRESSURE: 72 MMHG | RESPIRATION RATE: 16 BRPM | SYSTOLIC BLOOD PRESSURE: 120 MMHG | HEART RATE: 61 BPM | OXYGEN SATURATION: 99 %

## 2023-06-14 DIAGNOSIS — Z00.00 MEDICARE ANNUAL WELLNESS VISIT, SUBSEQUENT: Primary | ICD-10-CM

## 2023-06-14 DIAGNOSIS — E66.3 OVERWEIGHT (BMI 25.0-29.9): ICD-10-CM

## 2023-06-14 DIAGNOSIS — G30.1 MODERATE LATE ONSET ALZHEIMER'S DEMENTIA WITH AGITATION (HCC): Chronic | ICD-10-CM

## 2023-06-14 DIAGNOSIS — Z12.31 ENCOUNTER FOR SCREENING MAMMOGRAM FOR MALIGNANT NEOPLASM OF BREAST: ICD-10-CM

## 2023-06-14 DIAGNOSIS — F02.B11 MODERATE LATE ONSET ALZHEIMER'S DEMENTIA WITH AGITATION (HCC): Chronic | ICD-10-CM

## 2023-06-14 PROCEDURE — 99214 OFFICE O/P EST MOD 30 MIN: CPT

## 2023-06-14 PROCEDURE — G0439 PPPS, SUBSEQ VISIT: HCPCS

## 2023-06-14 NOTE — PATIENT INSTRUCTIONS
Medicare Preventive Visit Patient Instructions  Thank you for completing your Welcome to Medicare Visit or Medicare Annual Wellness Visit today  Your next wellness visit will be due in one year (6/14/2024)  The screening/preventive services that you may require over the next 5-10 years are detailed below  Some tests may not apply to you based off risk factors and/or age  Screening tests ordered at today's visit but not completed yet may show as past due  Also, please note that scanned in results may not display below  Preventive Screenings:  Service Recommendations Previous Testing/Comments   Colorectal Cancer Screening  * Colonoscopy    * Fecal Occult Blood Test (FOBT)/Fecal Immunochemical Test (FIT)  * Fecal DNA/Cologuard Test  * Flexible Sigmoidoscopy Age: 39-70 years old   Colonoscopy: every 10 years (may be performed more frequently if at higher risk)  OR  FOBT/FIT: every 1 year  OR  Cologuard: every 3 years  OR  Sigmoidoscopy: every 5 years  Screening may be recommended earlier than age 39 if at higher risk for colorectal cancer  Also, an individualized decision between you and your healthcare provider will decide whether screening between the ages of 74-80 would be appropriate  Colonoscopy: 07/18/2022  FOBT/FIT: Not on file  Cologuard: Not on file  Sigmoidoscopy: Not on file    Screening Current     Breast Cancer Screening Age: 36 years old  Frequency: every 1-2 years  Not required if history of left and right mastectomy Mammogram: 05/09/2022    Screening Current   Cervical Cancer Screening Between the ages of 21-29, pap smear recommended once every 3 years  Between the ages of 33-67, can perform pap smear with HPV co-testing every 5 years     Recommendations may differ for women with a history of total hysterectomy, cervical cancer, or abnormal pap smears in past  Pap Smear: 05/19/2022    Screening Not Indicated   Hepatitis C Screening Once for adults born between 1945 and 1965  More frequently in patients at high risk for Hepatitis C Hep C Antibody: 07/14/2022    Screening Current   Diabetes Screening 1-2 times per year if you're at risk for diabetes or have pre-diabetes Fasting glucose: 82 mg/dL (7/14/2022)  A1C: 5 6 % (7/14/2022)  Screening Current   Cholesterol Screening Once every 5 years if you don't have a lipid disorder  May order more often based on risk factors  Lipid panel: 07/14/2022    Screening Current     Other Preventive Screenings Covered by Medicare:  1  Abdominal Aortic Aneurysm (AAA) Screening: covered once if your at risk  You're considered to be at risk if you have a family history of AAA  2  Lung Cancer Screening: covers low dose CT scan once per year if you meet all of the following conditions: (1) Age 50-69; (2) No signs or symptoms of lung cancer; (3) Current smoker or have quit smoking within the last 15 years; (4) You have a tobacco smoking history of at least 20 pack years (packs per day multiplied by number of years you smoked); (5) You get a written order from a healthcare provider  3  Glaucoma Screening: covered annually if you're considered high risk: (1) You have diabetes OR (2) Family history of glaucoma OR (3)  aged 48 and older OR (3)  American aged 72 and older  3  Osteoporosis Screening: covered every 2 years if you meet one of the following conditions: (1) You're estrogen deficient and at risk for osteoporosis based off medical history and other findings; (2) Have a vertebral abnormality; (3) On glucocorticoid therapy for more than 3 months; (4) Have primary hyperparathyroidism; (5) On osteoporosis medications and need to assess response to drug therapy  · Last bone density test (DXA Scan): 06/20/2022   5  HIV Screening: covered annually if you're between the age of 15-65  Also covered annually if you are younger than 13 and older than 72 with risk factors for HIV infection   For pregnant patients, it is covered up to 3 times per pregnancy  Immunizations:  Immunization Recommendations   Influenza Vaccine Annual influenza vaccination during flu season is recommended for all persons aged >= 6 months who do not have contraindications   Pneumococcal Vaccine   * Pneumococcal conjugate vaccine = PCV13 (Prevnar 13), PCV15 (Vaxneuvance), PCV20 (Prevnar 20)  * Pneumococcal polysaccharide vaccine = PPSV23 (Pneumovax) Adults 25-60 years old: 1-3 doses may be recommended based on certain risk factors  Adults 72 years old: 1-2 doses may be recommended based off what pneumonia vaccine you previously received   Hepatitis B Vaccine 3 dose series if at intermediate or high risk (ex: diabetes, end stage renal disease, liver disease)   Tetanus (Td) Vaccine - COST NOT COVERED BY MEDICARE PART B Following completion of primary series, a booster dose should be given every 10 years to maintain immunity against tetanus  Td may also be given as tetanus wound prophylaxis  Tdap Vaccine - COST NOT COVERED BY MEDICARE PART B Recommended at least once for all adults  For pregnant patients, recommended with each pregnancy  Shingles Vaccine (Shingrix) - COST NOT COVERED BY MEDICARE PART B  2 shot series recommended in those aged 48 and above     Health Maintenance Due:      Topic Date Due   • Breast Cancer Screening: Mammogram  05/09/2023   • Colorectal Cancer Screening  07/17/2025   • Hepatitis C Screening  Completed     Immunizations Due:      Topic Date Due   • Pneumococcal Vaccine: 65+ Years (2 - PCV) 03/17/2023   • COVID-19 Vaccine (2 - Pfizer series) 03/30/2023     Advance Directives   What are advance directives? Advance directives are legal documents that state your wishes and plans for medical care  These plans are made ahead of time in case you lose your ability to make decisions for yourself  Advance directives can apply to any medical decision, such as the treatments you want, and if you want to donate organs     What are the types of advance directives? There are many types of advance directives, and each state has rules about how to use them  You may choose a combination of any of the following:  · Living will: This is a written record of the treatment you want  You can also choose which treatments you do not want, which to limit, and which to stop at a certain time  This includes surgery, medicine, IV fluid, and tube feedings  · Durable power of  for healthcare [facility]): This is a written record that states who you want to make healthcare choices for you when you are unable to make them for yourself  This person, called a proxy, is usually a family member or a friend  You may choose more than 1 proxy  · Do not resuscitate (DNR) order:  A DNR order is used in case your heart stops beating or you stop breathing  It is a request not to have certain forms of treatment, such as CPR  A DNR order may be included in other types of advance directives  · Medical directive: This covers the care that you want if you are in a coma, near death, or unable to make decisions for yourself  You can list the treatments you want for each condition  Treatment may include pain medicine, surgery, blood transfusions, dialysis, IV or tube feedings, and a ventilator (breathing machine)  · Values history: This document has questions about your views, beliefs, and how you feel and think about life  This information can help others choose the care that you would choose  Why are advance directives important? An advance directive helps you control your care  Although spoken wishes may be used, it is better to have your wishes written down  Spoken wishes can be misunderstood, or not followed  Treatments may be given even if you do not want them  An advance directive may make it easier for your family to make difficult choices about your care     Weight Management   Why it is important to manage your weight:  Being overweight increases your risk of health conditions such as heart disease, high blood pressure, type 2 diabetes, and certain types of cancer  It can also increase your risk for osteoarthritis, sleep apnea, and other respiratory problems  Aim for a slow, steady weight loss  Even a small amount of weight loss can lower your risk of health problems  How to lose weight safely:  A safe and healthy way to lose weight is to eat fewer calories and get regular exercise  You can lose up about 1 pound a week by decreasing the number of calories you eat by 500 calories each day  Healthy meal plan for weight management:  A healthy meal plan includes a variety of foods, contains fewer calories, and helps you stay healthy  A healthy meal plan includes the following:  · Eat whole-grain foods more often  A healthy meal plan should contain fiber  Fiber is the part of grains, fruits, and vegetables that is not broken down by your body  Whole-grain foods are healthy and provide extra fiber in your diet  Some examples of whole-grain foods are whole-wheat breads and pastas, oatmeal, brown rice, and bulgur  · Eat a variety of vegetables every day  Include dark, leafy greens such as spinach, kale, nichole greens, and mustard greens  Eat yellow and orange vegetables such as carrots, sweet potatoes, and winter squash  · Eat a variety of fruits every day  Choose fresh or canned fruit (canned in its own juice or light syrup) instead of juice  Fruit juice has very little or no fiber  · Eat low-fat dairy foods  Drink fat-free (skim) milk or 1% milk  Eat fat-free yogurt and low-fat cottage cheese  Try low-fat cheeses such as mozzarella and other reduced-fat cheeses  · Choose meat and other protein foods that are low in fat  Choose beans or other legumes such as split peas or lentils  Choose fish, skinless poultry (chicken or turkey), or lean cuts of red meat (beef or pork)  Before you cook meat or poultry, cut off any visible fat  · Use less fat and oil    Try baking foods instead of frying them  Add less fat, such as margarine, sour cream, regular salad dressing and mayonnaise to foods  Eat fewer high-fat foods  Some examples of high-fat foods include french fries, doughnuts, ice cream, and cakes  · Eat fewer sweets  Limit foods and drinks that are high in sugar  This includes candy, cookies, regular soda, and sweetened drinks  Exercise:  Exercise at least 30 minutes per day on most days of the week  Some examples of exercise include walking, biking, dancing, and swimming  You can also fit in more physical activity by taking the stairs instead of the elevator or parking farther away from stores  Ask your healthcare provider about the best exercise plan for you  © Copyright [employer] 2018 Information is for End User's use only and may not be sold, redistributed or otherwise used for commercial purposes   All illustrations and images included in CareNotes® are the copyrighted property of A D A M , Inc  or [address] [place] [address]

## 2023-06-14 NOTE — PROGRESS NOTES
Assessment and Plan:     Problem List Items Addressed This Visit        Nervous and Auditory    Moderate late onset Alzheimer's dementia with agitation ([address] ) (Chronic)     Following with geriatric medicine  Son denies issues with being primary caregiver  They are in the process of obtaining waiver services  Other    Overweight (BMI 25 0-29  9)    Relevant Orders    Basic metabolic panel    Lipid Panel with Direct LDL reflex   Other Visit Diagnoses     Medicare annual wellness visit, subsequent    -  Primary    Encounter for screening mammogram for malignant neoplasm of breast        Relevant Orders    Mammo screening bilateral w 3d & cad        BMI Counseling: Body mass index is 27 16 kg/m²  The BMI is above normal  Nutrition recommendations include decreasing portion sizes, encouraging healthy choices of fruits and vegetables, decreasing fast food intake, consuming healthier snacks, limiting drinks that contain sugar, moderation in carbohydrate intake, increasing intake of lean protein, reducing intake of saturated and trans fat and reducing intake of cholesterol  Exercise recommendations include moderate physical activity 150 minutes/week  No pharmacotherapy was ordered  Rationale for BMI follow-up plan is due to patient being overweight or obese  Preventive health issues were discussed with patient, and age appropriate screening tests were ordered as noted in patient's After Visit Summary  Personalized health advice and appropriate referrals for health education or preventive services given if needed, as noted in patient's After Visit Summary  History of Present Illness:     Patient presents for a Medicare Wellness Visit    Mora Goodman is a 77 y o  female  has a past surgical history that includes Appendectomy (2010)  She presents today with her son for an AWV       Patient Care Team:  Erik Harrell as PCP - General (Nurse Practitioner)  Anila Baldwin MD as PCP - [address] [facility] [facility]  Martita Sportsman as [facility] (Care Coordination)  Ranjit Back as  Care Manager (Care Coordination)     Review of Systems:     Review of Systems   Constitutional: Negative for chills and fever  HENT: Negative for ear pain and sore throat  Eyes: Negative for pain and visual disturbance  Respiratory: Negative for cough and shortness of breath  Cardiovascular: Negative for chest pain and palpitations  Gastrointestinal: Negative for abdominal pain and vomiting  Genitourinary: Negative for dysuria and hematuria  Musculoskeletal: Negative for arthralgias and back pain  Skin: Negative for color change and rash  Neurological: Negative for seizures and syncope  All other systems reviewed and are negative  Problem List:     Patient Active Problem List   Diagnosis   • Overweight (BMI 25 0-29  9)   • Moderate late onset Alzheimer's dementia with agitation (HCC)   • Osteopenia of multiple sites   • Primary hypertension   • Slow transit constipation      Past Medical and Surgical History:     Past Medical History:   Diagnosis Date   • No known health problems      Past Surgical History:   Procedure Laterality Date   • APPENDECTOMY  2010      Family History:     Family History   Problem Relation Age of Onset   • Breast cancer Neg Hx    • Colon cancer Neg Hx    • Ovarian cancer Neg Hx    • Cancer Neg Hx       Social History:     Social History     Socioeconomic History   • Marital status:       Spouse name: None   • Number of children: None   • Years of education: None   • Highest education level: None   Occupational History   • None   Tobacco Use   • Smoking status: Never   • Smokeless tobacco: Never   Vaping Use   • Vaping Use: Never used   Substance and Sexual Activity   • Alcohol use: Never   • Drug use: Never   • Sexual activity: Not Currently     Birth control/protection: Post-menopausal   Other Topics Concern   • None   Social History Narrative   • None     Social Determinants of Health     Financial Resource Strain: Low Risk  (6/12/2023)    Overall Financial Resource Strain (CARDIA)    • Difficulty of Paying Living Expenses: Not hard at all   Food Insecurity: No Food Insecurity (6/12/2023)    Hunger Vital Sign    • Worried About Running Out of Food in the Last Year: Never true    • Ran Out of Food in the Last Year: Never true   Transportation Needs: No Transportation Needs (6/12/2023)    PRAPARE - Transportation    • Lack of Transportation (Medical): No    • Lack of Transportation (Non-Medical): No   Physical Activity: Not on file   Stress: Not on file   Social Connections: Not on file   Intimate Partner Violence: Not on file   Housing Stability: Not on file      Medications and Allergies:     Current Outpatient Medications   Medication Sig Dispense Refill   • Calcium Carb-Cholecalciferol (Calcium 500 + D) 500-5 MG-MCG TABS Take 1 tablet by mouth 2 (two) times a day with meals 180 tablet 1   • lisinopril (ZESTRIL) 5 mg tablet Take 1 tablet (5 mg total) by mouth daily 90 tablet 3   • senna (SENOKOT) 8 6 mg Take 1 tablet (8 6 mg total) by mouth daily 90 tablet 3     No current facility-administered medications for this visit       Allergies   Allergen Reactions   • Donepezil Other (See Comments)     nightmares      Immunizations:     Immunization History   Administered Date(s) Administered   • COVID-19 Pfizer Vac BIVALENT Arjun-sucrose 12 Yr+ IM (BOOSTER ONLY) 11/30/2022   • Influenza, high dose seasonal 0 7 mL 03/17/2022, 11/30/2022   • Pneumococcal Polysaccharide PPV23 03/17/2022      Health Maintenance:         Topic Date Due   • Breast Cancer Screening: Mammogram  05/09/2023   • Colorectal Cancer Screening  07/17/2025   • Hepatitis C Screening  Completed         Topic Date Due   • Pneumococcal Vaccine: 65+ Years (2 - PCV) 03/17/2023   • COVID-19 Vaccine (2 - Pfizer series) 03/30/2023      Medicare Screening Tests and Risk Assessments: Sammy Aragon is here for her Initial Wellness visit  Health Risk Assessment:   Patient rates overall health as good  Patient feels that their physical health rating is much better  Patient is satisfied with their life  Eyesight was rated as same  Hearing was rated as same  Patient feels that their emotional and mental health rating is same  Patients states they are sometimes angry  Patient states they are sometimes unusually tired/fatigued  Pain experienced in the last 7 days has been none  Patient states that she has experienced no weight loss or gain in last 6 months  Fall Risk Screening: In the past year, patient has experienced: no history of falling in past year      Urinary Incontinence Screening:   Patient has not leaked urine accidently in the last six months  Home Safety:  Patient has trouble with stairs inside or outside of their home  Patient has working smoke alarms and has working carbon monoxide detector  Home safety hazards include: none  Nutrition:   Current diet is Regular  Medications:   Patient is not currently taking any over-the-counter supplements  Patient is not able to manage medications  Activities of Daily Living (ADLs)/Instrumental Activities of Daily Living (IADLs):   Walk and transfer into and out of bed and chair?: Yes  Dress and groom yourself?: Yes    Bathe or shower yourself?: No    Feed yourself?  Yes  Do your laundry/housekeeping?: No  Manage your money, pay your bills and track your expenses?: No  Make your own meals?: No    Do your own shopping?: No    Previous Hospitalizations:   Any hospitalizations or ED visits within the last 12 months?: No      Advance Care Planning:   Living will: No    Durable POA for healthcare: No    Advanced directive: No      PREVENTIVE SCREENINGS      Cardiovascular Screening:    General: Screening Current      Diabetes Screening:     General: Screening Current      Colorectal Cancer Screening:     General: Screening "Current      Breast Cancer Screening:     General: Screening Current      Cervical Cancer Screening:    General: Screening Not Indicated      Osteoporosis Screening:    General: Screening Not Indicated and History Osteoporosis      Lung Cancer Screening:     General: Screening Not Indicated      Hepatitis C Screening:    General: Screening Current    Screening, Brief Intervention, and Referral to Treatment (SBIRT)    Screening  Typical number of drinks in a day: 0  Typical number of drinks in a week: 0  Interpretation: Low risk drinking behavior  Single Item Drug Screening:  How often have you used an illegal drug (including marijuana) or a prescription medication for non-medical reasons in the past year? never    Single Item Drug Screen Score: 0  Interpretation: Negative screen for possible drug use disorder    No results found  Physical Exam:     /72 (BP Location: Right arm, Patient Position: Sitting, Cuff Size: Standard)   Pulse 61   Temp 98 7 °F (37 1 °C) (Temporal)   Resp 16   Ht 4' 7 5\" (1 41 m)   Wt 54 kg (119 lb)   SpO2 99%   BMI 27 16 kg/m²     Physical Exam  Vitals and nursing note reviewed  Constitutional:       General: She is not in acute distress  Appearance: Normal appearance  She is well-developed and overweight  HENT:      Head: Normocephalic and atraumatic  Right Ear: External ear normal       Left Ear: External ear normal       Nose: Nose normal    Eyes:      Extraocular Movements: Extraocular movements intact  Conjunctiva/sclera: Conjunctivae normal       Pupils: Pupils are equal, round, and reactive to light  Cardiovascular:      Rate and Rhythm: Normal rate and regular rhythm  Pulses: Normal pulses  Heart sounds: Normal heart sounds  No murmur heard  Pulmonary:      Effort: Pulmonary effort is normal  No respiratory distress  Breath sounds: Normal breath sounds     Abdominal:      General: Bowel sounds are normal       Palpations: Abdomen " is soft  Tenderness: There is no abdominal tenderness  Musculoskeletal:         General: Normal range of motion  Cervical back: Normal range of motion and neck supple  Skin:     General: Skin is warm and dry  Capillary Refill: Capillary refill takes less than 2 seconds  Neurological:      Mental Status: She is alert  Mental status is at baseline  She is disoriented and confused  Psychiatric:         Mood and Affect: Mood normal          Behavior: Behavior normal  Behavior is cooperative  Thought Content: Thought content normal          Cognition and Memory: Cognition is impaired  Memory is impaired           Judgment: Judgment normal           Nevin Craig

## 2023-06-14 NOTE — ASSESSMENT & PLAN NOTE
Following with geriatric medicine  Son denies issues with being primary caregiver  They are in the process of obtaining waiver services

## 2023-06-18 DIAGNOSIS — M85.89 OSTEOPENIA OF MULTIPLE SITES: ICD-10-CM

## 2023-06-19 ENCOUNTER — PATIENT OUTREACH (OUTPATIENT)
Dept: FAMILY MEDICINE CLINIC | Facility: CLINIC | Age: 68
End: 2023-06-19

## 2023-06-19 NOTE — PROGRESS NOTES
Incoming Call  06/19/2023    [facility] received phone call from Vince, Aleksey Son, on this day  Vince stated that  called to informed that Nicole Chacon was approved for 56hours weekly  Danielito Mcknight that as soon [facility] received authorization they will contact him to start working  Cirilo expressed understanding and will wait for the call  Also [facility] will contact Lori Robles to informed  [facility] will continue to follow up  Next follow up was scheduled on 06/25/2023

## 2023-06-23 ENCOUNTER — APPOINTMENT (OUTPATIENT)
Dept: LAB | Facility: CLINIC | Age: 68
End: 2023-06-23
Payer: COMMERCIAL

## 2023-06-23 DIAGNOSIS — E66.3 OVERWEIGHT (BMI 25.0-29.9): ICD-10-CM

## 2023-06-23 LAB
ANION GAP SERPL CALCULATED.3IONS-SCNC: 1 MMOL/L
BUN SERPL-MCNC: 11 MG/DL (ref 5–25)
CALCIUM SERPL-MCNC: 9.5 MG/DL (ref 8.3–10.1)
CHLORIDE SERPL-SCNC: 109 MMOL/L (ref 96–108)
CHOLEST SERPL-MCNC: 219 MG/DL
CO2 SERPL-SCNC: 30 MMOL/L (ref 21–32)
CREAT SERPL-MCNC: 0.73 MG/DL (ref 0.6–1.3)
GFR SERPL CREATININE-BSD FRML MDRD: 83 ML/MIN/1.73SQ M
GLUCOSE P FAST SERPL-MCNC: 81 MG/DL (ref 65–99)
HDLC SERPL-MCNC: 55 MG/DL
LDLC SERPL CALC-MCNC: 134 MG/DL (ref 0–100)
POTASSIUM SERPL-SCNC: 4.3 MMOL/L (ref 3.5–5.3)
SODIUM SERPL-SCNC: 140 MMOL/L (ref 135–147)
TRIGL SERPL-MCNC: 148 MG/DL

## 2023-06-23 PROCEDURE — 80061 LIPID PANEL: CPT

## 2023-06-23 PROCEDURE — 36415 COLL VENOUS BLD VENIPUNCTURE: CPT

## 2023-06-23 PROCEDURE — 80048 BASIC METABOLIC PNL TOTAL CA: CPT

## 2023-06-26 ENCOUNTER — PATIENT OUTREACH (OUTPATIENT)
Dept: FAMILY MEDICINE CLINIC | Facility: CLINIC | Age: 68
End: 2023-06-26

## 2023-06-26 DIAGNOSIS — E78.2 MIXED HYPERLIPIDEMIA: Primary | ICD-10-CM

## 2023-06-26 RX ORDER — ATORVASTATIN CALCIUM 10 MG/1
10 TABLET, FILM COATED ORAL DAILY
Qty: 90 TABLET | Refills: 3 | Status: SHIPPED | OUTPATIENT
Start: 2023-06-26

## 2023-06-26 NOTE — PROGRESS NOTES
Outgoing Call  06/26/2023    [address] [place] [address] called Les Emanuel, Liseth's Son, on this day to follow up with [address]  Les Emanuel stated that he started working last Friday 06/23/2023 as paid caregiver with [address]  Les Emanuel will be working for [employer] 8 hours per day, every day  [address] [place] [address] explained Cirilo that any changes with Liseth's [address]  must be address to  Ernesto Presley from [facility], phone number is [phone]  Cirilo expressed understanding  Also [facility] explained Cirilo that this referral will be closed on my behalf due goal met  Les Emanuel was very thankful and expressed understanding  This referral received from [address] [place] [address] to assist with [address]  will be closed on my behalf today 06/26/2023  No follow up was scheduled at this time

## 2023-06-27 ENCOUNTER — PATIENT OUTREACH (OUTPATIENT)
Dept: FAMILY MEDICINE CLINIC | Facility: CLINIC | Age: 68
End: 2023-06-27

## 2023-07-19 NOTE — PROGRESS NOTES
GRISELDA CARRANZA received notification from [facility] that pt's son was appointed as her paid caregiver. [address] closed the referral as they have no other active goals a this time. GRISELDA CARRANZA will close the referral as they are no active goals. GRISELDA CARRANZA will be available for any additional needs as requested.

## 2023-12-06 ENCOUNTER — OFFICE VISIT (OUTPATIENT)
Age: 68
End: 2023-12-06
Payer: COMMERCIAL

## 2023-12-06 VITALS
BODY MASS INDEX: 24.34 KG/M2 | SYSTOLIC BLOOD PRESSURE: 132 MMHG | DIASTOLIC BLOOD PRESSURE: 82 MMHG | RESPIRATION RATE: 16 BRPM | OXYGEN SATURATION: 99 % | WEIGHT: 108.2 LBS | HEIGHT: 56 IN | TEMPERATURE: 97.2 F | HEART RATE: 89 BPM

## 2023-12-06 DIAGNOSIS — F02.B11 MODERATE LATE ONSET ALZHEIMER'S DEMENTIA WITH AGITATION (HCC): Primary | Chronic | ICD-10-CM

## 2023-12-06 DIAGNOSIS — K59.01 SLOW TRANSIT CONSTIPATION: ICD-10-CM

## 2023-12-06 DIAGNOSIS — Z91.81 AT MODERATE RISK FOR FALL: ICD-10-CM

## 2023-12-06 DIAGNOSIS — I10 PRIMARY HYPERTENSION: ICD-10-CM

## 2023-12-06 DIAGNOSIS — G30.1 MODERATE LATE ONSET ALZHEIMER'S DEMENTIA WITH AGITATION (HCC): Primary | Chronic | ICD-10-CM

## 2023-12-06 PROCEDURE — 99483 ASSMT & CARE PLN PT COG IMP: CPT | Performed by: INTERNAL MEDICINE

## 2023-12-06 NOTE — ASSESSMENT & PLAN NOTE
Decision-making capacity: Does not have capacity for financial or medical decisions     Staging: Moderate-Severe stage Alzheimer's dementia (FAST stage 6c)      Medications Review: reviewed current medications. Appropriate for current medical diagnoses. Caregiver Review:  With some caregiver stress but reports being content with current setup, denies offer for pursuit of home health aid

## 2023-12-06 NOTE — PATIENT INSTRUCTIONS
ASSESSMENT AND PLAN:  1. Moderate late onset Alzheimer's dementia with agitation [facility])  Assessment & Plan:    Decision-making capacity: Does not have capacity for financial or medical decisions     Staging: Moderate-Severe stage Alzheimer's dementia (FAST stage 6c)      Medications Review: reviewed current medications. Appropriate for current medical diagnoses. Caregiver Review: With some caregiver stress but reports being content with current setup, denies offer for pursuit of home health aid      2. Primary hypertension  Assessment & Plan:  Stable. Continue lisinopril. Could consider reducing if BP remains well controlled. 3. Slow transit constipation  Assessment & Plan:  Continue senna with fiber. 4. At moderate risk for fall  Assessment & Plan: With out recent fall, but with moderate fall risk 2/2 dementia and increasing frailty. HPI:    We had the pleasure of evaluating Agnes De La Paz who is a 77 y.o. female in Geriatric consultation today. Ms. Nikko Zavala is in the office with her son. She lives with her son Nessa Gutierrez    HISTORY AS PER SON:    COGNITION UPDATE:  Initially reports overall stable status. He continues to assist with showering. He helps with toileting as well. FUNCTIONAL STATUS:  BADLs  Does patient require assistance with:  Bathing: Yes  Dressing: Yes  Transferring: No  Continence: No  Toileting: Yes  Feeding: No    IADLs  Dose patient require assistance with:  Telephone: Yes  Shopping: Yes  Food Preparation: Yes  Housekeeping: Yes  Laundry: Yes  Transportation: Yes  Medications: Yes  Finances: Yes    NEUROPSYCH SYMPTOMS:  Does patient get angry or hostile? Resist care from others? Yes  Does patient see or hear things that no one else can see or hear? No  Does patient act impatient and cranky? Does mood frequently change for no apparent reason?  No  Does patient act suspicious without good reason (example: believes that others are stealing from him or her, or planning to harm him or her in some way)? No  Does patient less interested in his or her usual activities or in the activities and plans of others? Yes  Does patient have trouble sleeping at night? Yes    SAFETY:  Hearing and vision issue: No  Any gait or balance disorder: Yes  Uses: no assistive devices  Any falls in the last year: No  Any history of wandering: No  Are there firearms or guns in the home: No  Does patient drive: No  Any driving accidents or citations in the last year: No  Any concerns about patient's ability to drive: Yes    ACP REVIEW:  Does patient have POA: No  Does patient have a Living will: No  Any legal assistance needed for healthcare planning?: Yes    Allergies   Allergen Reactions    Donepezil Other (See Comments)     nightmares       Medications:    Current Outpatient Medications on File Prior to Visit   Medication Sig Dispense Refill    atorvastatin (LIPITOR) 10 mg tablet Take 1 tablet (10 mg total) by mouth daily 90 tablet 3    Calcium Carb-Cholecalciferol (calcium carbonate-vitamin D) 500 mg-5 mcg tablet TAKE 1 TABLET BY MOUTH TWICE A DAY WITH MEALS 180 tablet 1    lisinopril (ZESTRIL) 5 mg tablet Take 1 tablet (5 mg total) by mouth daily 90 tablet 3    senna (SENOKOT) 8.6 mg Take 1 tablet (8.6 mg total) by mouth daily 90 tablet 3     No current facility-administered medications on file prior to visit. History:  Past Medical History:   Diagnosis Date    No known health problems      Past Surgical History:   Procedure Laterality Date    APPENDECTOMY  2010     Family History   Problem Relation Age of Onset    Breast cancer Neg Hx     Colon cancer Neg Hx     Ovarian cancer Neg Hx     Cancer Neg Hx      Social History     Socioeconomic History    Marital status:       Spouse name: Not on file    Number of children: Not on file    Years of education: Not on file    Highest education level: Not on file   Occupational History    Not on file   Tobacco Use    Smoking status: Never Smokeless tobacco: Never   Vaping Use    Vaping Use: Never used   Substance and Sexual Activity    Alcohol use: Never    Drug use: Never    Sexual activity: Not Currently     Birth control/protection: Post-menopausal   Other Topics Concern    Not on file   Social History Narrative    Not on file     Social Determinants of Health     Financial Resource Strain: Low Risk  (6/12/2023)    Overall Financial Resource Strain (CARDIA)     Difficulty of Paying Living Expenses: Not hard at all   Food Insecurity: No Food Insecurity (6/12/2023)    Hunger Vital Sign     Worried About Running Out of Food in the Last Year: Never true     Ran Out of Food in the Last Year: Never true   Transportation Needs: No Transportation Needs (6/12/2023)    PRAPARE - Transportation     Lack of Transportation (Medical): No     Lack of Transportation (Non-Medical): No   Physical Activity: Not on file   Stress: Not on file   Social Connections: Not on file   Intimate Partner Violence: Not on file   Housing Stability: Not on file     Past Surgical History:   Procedure Laterality Date    APPENDECTOMY  2010       OBJECTIVE:  Vitals:    12/06/23 1352   BP: 132/82   BP Location: Left arm   Patient Position: Sitting   Cuff Size: Adult   Pulse: 89   Resp: 16   Temp: (!) 97.2 °F (36.2 °C)   TempSrc: Temporal   SpO2: 99%   Weight: 49.1 kg (108 lb 3.2 oz)   Height: 4' 7.5" (1.41 m)     Body mass index is 24.7 kg/m².   Physical Exam    5/16/23 MoCA: 4/30 (although unable to complete)      Labs & Imaging:  Lab Results   Component Value Date    WBC 7.03 07/14/2022    HGB 14.4 07/14/2022    HCT 45.6 07/14/2022    MCV 95 07/14/2022     07/14/2022     Lab Results   Component Value Date    SODIUM 140 06/23/2023    K 4.3 06/23/2023     (H) 06/23/2023    CO2 30 06/23/2023    AGAP 1 06/23/2023    BUN 11 06/23/2023    CREATININE 0.73 06/23/2023    GLUF 81 06/23/2023    CALCIUM 9.5 06/23/2023    AST 18 07/14/2022    ALT 19 07/14/2022    ALKPHOS 101 07/14/2022    TP 7.7 07/14/2022    TBILI 0.66 07/14/2022    EGFR 83 06/23/2023     Lab Results   Component Value Date    HGBA1C 5.6 07/14/2022     Lab Results   Component Value Date    CHOLESTEROL 219 (H) 06/23/2023    CHOLESTEROL 216 (H) 07/14/2022     Lab Results   Component Value Date    HDL 55 06/23/2023    HDL 61 07/14/2022     Lab Results   Component Value Date    TRIG 148 06/23/2023    TRIG 102 07/14/2022     Lab Results   Component Value Date    NONHDLC 155 07/14/2022     Lab Results   Component Value Date    LDLCALC 134 (H) 06/23/2023    LDLCALC 135 (H) 07/14/2022     Lab Results   Component Value Date    EXBRAIEF43 431 07/14/2022     Lab Results   Component Value Date    FCM1VIZNQEAP 1.480 07/14/2022     Lab Results   Component Value Date    RPR Non-Reactive 07/14/2022       Results for orders placed during the hospital encounter of 11/11/22    MRI brain wo contrast    Narrative  MRI BRAIN WITHOUT CONTRAST    INDICATION: R41.3: Other amnesia  R41.89: Other symptoms and signs involving cognitive functions and awareness. COMPARISON:   None. TECHNIQUE:  Multiplanar, multisequence imaging of the brain was performed. IMAGE QUALITY:  Diagnostic. FINDINGS:    BRAIN PARENCHYMA: Mild cerebral volume loss. There is no discrete mass, mass effect or midline shift. There is no intracranial hemorrhage. Diffusion imaging is unremarkable. Very minimal nonspecific white matter change suggesting microangiopathy. VENTRICLES:  Normal for the patient's age. SELLA AND PITUITARY GLAND:  Normal.    ORBITS:  Prior bilateral cataract surgery. PARANASAL SINUSES:  Minimal ethmoidal air cells mucosal thickening. VASCULATURE:  Evaluation of the major intracranial vasculature demonstrates appropriate flow voids. CALVARIUM AND SKULL BASE:  Normal.    EXTRACRANIAL SOFT TISSUES:  Normal.    Impression  Mild cerebral volume loss.   Consider quantitative volumetric brain MRI (NEUROQUANT), if mesial temporal lobe focused neurodegenerative process is concerned.

## 2023-12-06 NOTE — PROGRESS NOTES
ASSESSMENT AND PLAN:  1. Moderate late onset Alzheimer's dementia with agitation [facility])  Assessment & Plan:    Decision-making capacity: Does not have capacity for financial or medical decisions     Staging: Moderate-Severe stage Alzheimer's dementia (FAST stage 6c)      Medications Review: reviewed current medications. Appropriate for current medical diagnoses. Caregiver Review: With some caregiver stress but reports being content with current setup, denies offer for pursuit of home health aid      2. Primary hypertension  Assessment & Plan:  Stable. Continue lisinopril. Could consider reducing if BP remains well controlled. 3. Slow transit constipation  Assessment & Plan:  Continue senna with fiber. 4. At moderate risk for fall  Assessment & Plan: With out recent fall, but with moderate fall risk 2/2 dementia and increasing frailty. HPI:    We had the pleasure of evaluating Renee Glover who is a 77 y.o. female in Geriatric consultation today. Ms. Fareed Kothari is in the office with her son. She lives with her son Maddie Hernadnez    HISTORY AS PER SON:    COGNITION UPDATE:  Initially reports overall stable status. He continues to assist with showering. He helps with toileting as well. FUNCTIONAL STATUS:  BADLs  Does patient require assistance with:  Bathing: Yes  Dressing: Yes  Transferring: No  Continence: No  Toileting: Yes  Feeding: No    IADLs  Dose patient require assistance with:  Telephone: Yes  Shopping: Yes  Food Preparation: Yes  Housekeeping: Yes  Laundry: Yes  Transportation: Yes  Medications: Yes  Finances: Yes    NEUROPSYCH SYMPTOMS:  Does patient get angry or hostile? Resist care from others? Yes  Does patient see or hear things that no one else can see or hear? No  Does patient act impatient and cranky? Does mood frequently change for no apparent reason?  No  Does patient act suspicious without good reason (example: believes that others are stealing from him or her, or planning to harm him or her in some way)? No  Does patient less interested in his or her usual activities or in the activities and plans of others? Yes  Does patient have trouble sleeping at night? Yes    SAFETY:  Hearing and vision issue: No  Any gait or balance disorder: Yes  Uses: no assistive devices  Any falls in the last year: No  Any history of wandering: No  Are there firearms or guns in the home: No  Does patient drive: No  Any driving accidents or citations in the last year: No  Any concerns about patient's ability to drive: Yes    ACP REVIEW:  Does patient have POA: No  Does patient have a Living will: No  Any legal assistance needed for healthcare planning?: Yes    Allergies   Allergen Reactions    Donepezil Other (See Comments)     nightmares       Medications:    Current Outpatient Medications on File Prior to Visit   Medication Sig Dispense Refill    atorvastatin (LIPITOR) 10 mg tablet Take 1 tablet (10 mg total) by mouth daily 90 tablet 3    Calcium Carb-Cholecalciferol (calcium carbonate-vitamin D) 500 mg-5 mcg tablet TAKE 1 TABLET BY MOUTH TWICE A DAY WITH MEALS 180 tablet 1    lisinopril (ZESTRIL) 5 mg tablet Take 1 tablet (5 mg total) by mouth daily 90 tablet 3    senna (SENOKOT) 8.6 mg Take 1 tablet (8.6 mg total) by mouth daily 90 tablet 3     No current facility-administered medications on file prior to visit. History:  Past Medical History:   Diagnosis Date    No known health problems      Past Surgical History:   Procedure Laterality Date    APPENDECTOMY  2010     Family History   Problem Relation Age of Onset    Breast cancer Neg Hx     Colon cancer Neg Hx     Ovarian cancer Neg Hx     Cancer Neg Hx      Social History     Socioeconomic History    Marital status:       Spouse name: Not on file    Number of children: Not on file    Years of education: Not on file    Highest education level: Not on file   Occupational History    Not on file   Tobacco Use    Smoking status: Never Smokeless tobacco: Never   Vaping Use    Vaping Use: Never used   Substance and Sexual Activity    Alcohol use: Never    Drug use: Never    Sexual activity: Not Currently     Birth control/protection: Post-menopausal   Other Topics Concern    Not on file   Social History Narrative    Not on file     Social Determinants of Health     Financial Resource Strain: Low Risk  (6/12/2023)    Overall Financial Resource Strain (CARDIA)     Difficulty of Paying Living Expenses: Not hard at all   Food Insecurity: No Food Insecurity (6/12/2023)    Hunger Vital Sign     Worried About Running Out of Food in the Last Year: Never true     Ran Out of Food in the Last Year: Never true   Transportation Needs: No Transportation Needs (6/12/2023)    PRAPARE - Transportation     Lack of Transportation (Medical): No     Lack of Transportation (Non-Medical): No   Physical Activity: Not on file   Stress: Not on file   Social Connections: Not on file   Intimate Partner Violence: Not on file   Housing Stability: Not on file     Past Surgical History:   Procedure Laterality Date    APPENDECTOMY  2010       OBJECTIVE:  Vitals:    12/06/23 1352   BP: 132/82   BP Location: Left arm   Patient Position: Sitting   Cuff Size: Adult   Pulse: 89   Resp: 16   Temp: (!) 97.2 °F (36.2 °C)   TempSrc: Temporal   SpO2: 99%   Weight: 49.1 kg (108 lb 3.2 oz)   Height: 4' 7.5" (1.41 m)     Body mass index is 24.7 kg/m².   Physical Exam    5/16/23 MoCA: 4/30 (although unable to complete)      Labs & Imaging:  Lab Results   Component Value Date    WBC 7.03 07/14/2022    HGB 14.4 07/14/2022    HCT 45.6 07/14/2022    MCV 95 07/14/2022     07/14/2022     Lab Results   Component Value Date    SODIUM 140 06/23/2023    K 4.3 06/23/2023     (H) 06/23/2023    CO2 30 06/23/2023    AGAP 1 06/23/2023    BUN 11 06/23/2023    CREATININE 0.73 06/23/2023    GLUF 81 06/23/2023    CALCIUM 9.5 06/23/2023    AST 18 07/14/2022    ALT 19 07/14/2022    ALKPHOS 101 07/14/2022    TP 7.7 07/14/2022    TBILI 0.66 07/14/2022    EGFR 83 06/23/2023     Lab Results   Component Value Date    HGBA1C 5.6 07/14/2022     Lab Results   Component Value Date    CHOLESTEROL 219 (H) 06/23/2023    CHOLESTEROL 216 (H) 07/14/2022     Lab Results   Component Value Date    HDL 55 06/23/2023    HDL 61 07/14/2022     Lab Results   Component Value Date    TRIG 148 06/23/2023    TRIG 102 07/14/2022     Lab Results   Component Value Date    NONHDLC 155 07/14/2022     Lab Results   Component Value Date    LDLCALC 134 (H) 06/23/2023    LDLCALC 135 (H) 07/14/2022     Lab Results   Component Value Date    QWRELEXB00 431 07/14/2022     Lab Results   Component Value Date    [id number] 1.480 07/14/2022     Lab Results   Component Value Date    RPR Non-Reactive 07/14/2022       Results for orders placed during the hospital encounter of 11/11/22    MRI brain wo contrast    Narrative  MRI BRAIN WITHOUT CONTRAST    INDICATION: R41.3: Other amnesia  R41.89: Other symptoms and signs involving cognitive functions and awareness. COMPARISON:   None. TECHNIQUE:  Multiplanar, multisequence imaging of the brain was performed. IMAGE QUALITY:  Diagnostic. FINDINGS:    BRAIN PARENCHYMA: Mild cerebral volume loss. There is no discrete mass, mass effect or midline shift. There is no intracranial hemorrhage. Diffusion imaging is unremarkable. Very minimal nonspecific white matter change suggesting microangiopathy. VENTRICLES:  Normal for the patient's age. SELLA AND PITUITARY GLAND:  Normal.    ORBITS:  Prior bilateral cataract surgery. PARANASAL SINUSES:  Minimal ethmoidal air cells mucosal thickening. VASCULATURE:  Evaluation of the major intracranial vasculature demonstrates appropriate flow voids. CALVARIUM AND SKULL BASE:  Normal.    EXTRACRANIAL SOFT TISSUES:  Normal.    Impression  Mild cerebral volume loss.   Consider quantitative volumetric brain MRI (NEUROQUANT), if mesial temporal lobe focused neurodegenerative process is concerned.

## 2023-12-13 NOTE — PROGRESS NOTES
Name: Oj Haywood      : 1955      MRN: [id number]  Encounter Provider: BERNARD Chapman  Encounter Date: 2023   Encounter department: [address]     1. Primary hypertension  Assessment & Plan:  BP Readings from Last 3 Encounters:   23 110/80   23 132/82   23 120/72     - Continue lisinopril 5 mg daily      2. Slow transit constipation  -     senna (SENOKOT) 8.6 mg; Take 1 tablet (8.6 mg total) by mouth daily    3. Encounter for immunization  -     influenza vaccine, high-dose, PF 0.7 mL (FLUZONE HIGH-DOSE)    4. Screening cholesterol level  -     Lipid panel; Future    5. Diabetes mellitus screening  -     Basic metabolic panel; Future    6. Moderate late onset Alzheimer's dementia with agitation ([address])  Assessment & Plan:  - continue f/u geriatric medicine      7. Osteopenia of multiple sites  Assessment & Plan:  - Continue calcium & vit D daily. Depression Screening and Follow-up Plan: Patient was screened for depression during today's encounter. They screened negative with a PHQ-2 score of 0. Subjective      Oj Haywood is a 77 y.o. female  has a past medical history of No known health problems. has a past surgical history that includes Appendectomy (). She presents today for a HTN follow-up with her son. Denies any acute complaints. Review of Systems   Constitutional:  Negative for chills and fever. HENT:  Negative for ear pain and sore throat. Eyes:  Negative for pain and visual disturbance. Respiratory:  Negative for cough and shortness of breath. Cardiovascular:  Negative for chest pain and palpitations. Gastrointestinal:  Negative for abdominal pain and vomiting. Genitourinary:  Negative for dysuria and hematuria. Musculoskeletal:  Negative for arthralgias and back pain. Skin:  Negative for color change and rash.    Neurological:  Negative for seizures and syncope. All other systems reviewed and are negative. Current Outpatient Medications on File Prior to Visit   Medication Sig    atorvastatin (LIPITOR) 10 mg tablet Take 1 tablet (10 mg total) by mouth daily    Calcium Carb-Cholecalciferol (calcium carbonate-vitamin D) 500 mg-5 mcg tablet TAKE 1 TABLET BY MOUTH TWICE A DAY WITH MEALS    lisinopril (ZESTRIL) 5 mg tablet Take 1 tablet (5 mg total) by mouth daily    [DISCONTINUED] senna (SENOKOT) 8.6 mg Take 1 tablet (8.6 mg total) by mouth daily       Objective     /80 (BP Location: Left arm, Patient Position: Sitting, Cuff Size: Standard)   Pulse 78   Temp 97.9 °F (36.6 °C) (Temporal)   Resp 16   Ht 4' 8" (1.422 m)   Wt 49.4 kg (109 lb)   SpO2 93%   BMI 24.44 kg/m²     Physical Exam  Vitals and nursing note reviewed. HENT:      Head: Normocephalic and atraumatic. Right Ear: External ear normal.      Left Ear: External ear normal.      Nose: Nose normal.   Eyes:      Extraocular Movements: Extraocular movements intact. Conjunctiva/sclera: Conjunctivae normal.      Pupils: Pupils are equal, round, and reactive to light. Cardiovascular:      Rate and Rhythm: Normal rate and regular rhythm. Pulses: Normal pulses. Heart sounds: Normal heart sounds. Pulmonary:      Effort: Pulmonary effort is normal.      Breath sounds: Normal breath sounds. Abdominal:      General: Bowel sounds are normal.      Palpations: Abdomen is soft. Tenderness: There is no abdominal tenderness. Musculoskeletal:         General: Normal range of motion. Cervical back: Normal range of motion. Skin:     General: Skin is warm and dry. Capillary Refill: Capillary refill takes less than 2 seconds. Neurological:      General: No focal deficit present. Mental Status: She is alert and oriented to person, place, and time. Mental status is at baseline.    Psychiatric:         Mood and Affect: Mood normal.         Behavior: Behavior normal.         Thought Content:  Thought content normal.         Judgment: Judgment normal.       Brennon Strong

## 2023-12-14 ENCOUNTER — OFFICE VISIT (OUTPATIENT)
Dept: FAMILY MEDICINE CLINIC | Facility: CLINIC | Age: 68
End: 2023-12-14

## 2023-12-14 ENCOUNTER — RA CDI HCC (OUTPATIENT)
Dept: OTHER | Facility: HOSPITAL | Age: 68
End: 2023-12-14

## 2023-12-14 VITALS
RESPIRATION RATE: 16 BRPM | HEART RATE: 78 BPM | TEMPERATURE: 97.9 F | SYSTOLIC BLOOD PRESSURE: 110 MMHG | BODY MASS INDEX: 24.52 KG/M2 | OXYGEN SATURATION: 93 % | HEIGHT: 56 IN | WEIGHT: 109 LBS | DIASTOLIC BLOOD PRESSURE: 80 MMHG

## 2023-12-14 DIAGNOSIS — Z13.220 SCREENING CHOLESTEROL LEVEL: ICD-10-CM

## 2023-12-14 DIAGNOSIS — Z13.1 DIABETES MELLITUS SCREENING: ICD-10-CM

## 2023-12-14 DIAGNOSIS — M85.89 OSTEOPENIA OF MULTIPLE SITES: ICD-10-CM

## 2023-12-14 DIAGNOSIS — F02.B11 MODERATE LATE ONSET ALZHEIMER'S DEMENTIA WITH AGITATION (HCC): Chronic | ICD-10-CM

## 2023-12-14 DIAGNOSIS — G30.1 MODERATE LATE ONSET ALZHEIMER'S DEMENTIA WITH AGITATION (HCC): Chronic | ICD-10-CM

## 2023-12-14 DIAGNOSIS — K59.01 SLOW TRANSIT CONSTIPATION: ICD-10-CM

## 2023-12-14 DIAGNOSIS — I10 PRIMARY HYPERTENSION: Primary | ICD-10-CM

## 2023-12-14 DIAGNOSIS — Z23 ENCOUNTER FOR IMMUNIZATION: ICD-10-CM

## 2023-12-14 PROBLEM — E66.3 OVERWEIGHT (BMI 25.0-29.9): Status: RESOLVED | Noted: 2022-03-17 | Resolved: 2023-12-14

## 2023-12-14 PROCEDURE — 90471 IMMUNIZATION ADMIN: CPT

## 2023-12-14 PROCEDURE — 90662 IIV NO PRSV INCREASED AG IM: CPT

## 2023-12-14 PROCEDURE — 99214 OFFICE O/P EST MOD 30 MIN: CPT

## 2023-12-14 RX ORDER — SENNOSIDES 8.6 MG
8.6 TABLET ORAL DAILY
Qty: 90 TABLET | Refills: 3 | Status: SHIPPED | OUTPATIENT
Start: 2023-12-14

## 2023-12-14 NOTE — PROGRESS NOTES
[address] [facility] coding opportunities       Chart reviewed, no opportunity found: CHART REVIEWED, NO OPPORTUNITY FOUND        Patients Insurance     Medicare Insurance: [employer]

## 2023-12-14 NOTE — ASSESSMENT & PLAN NOTE
BP Readings from Last 3 Encounters:   12/14/23 110/80   12/06/23 132/82   06/14/23 120/72     - Continue lisinopril 5 mg daily

## 2023-12-21 DIAGNOSIS — M85.89 OSTEOPENIA OF MULTIPLE SITES: ICD-10-CM

## 2024-05-31 DIAGNOSIS — I10 PRIMARY HYPERTENSION: ICD-10-CM

## 2024-05-31 RX ORDER — LISINOPRIL 5 MG/1
5 TABLET ORAL DAILY
Qty: 90 TABLET | Refills: 1 | Status: SHIPPED | OUTPATIENT
Start: 2024-05-31

## 2024-06-07 ENCOUNTER — RA CDI HCC (OUTPATIENT)
Dept: OTHER | Facility: HOSPITAL | Age: 69
End: 2024-06-07

## 2024-06-17 ENCOUNTER — OFFICE VISIT (OUTPATIENT)
Dept: FAMILY MEDICINE CLINIC | Facility: CLINIC | Age: 69
End: 2024-06-17

## 2024-06-17 VITALS
RESPIRATION RATE: 16 BRPM | TEMPERATURE: 98.2 F | HEART RATE: 55 BPM | OXYGEN SATURATION: 99 % | HEIGHT: 56 IN | DIASTOLIC BLOOD PRESSURE: 66 MMHG | SYSTOLIC BLOOD PRESSURE: 121 MMHG | BODY MASS INDEX: 20.54 KG/M2 | WEIGHT: 91.3 LBS

## 2024-06-17 DIAGNOSIS — Z23 ENCOUNTER FOR IMMUNIZATION: ICD-10-CM

## 2024-06-17 DIAGNOSIS — R79.9 ABNORMAL FINDING OF BLOOD CHEMISTRY, UNSPECIFIED: ICD-10-CM

## 2024-06-17 DIAGNOSIS — G30.1 MODERATE LATE ONSET ALZHEIMER'S DEMENTIA WITH AGITATION (HCC): ICD-10-CM

## 2024-06-17 DIAGNOSIS — M85.89 OSTEOPENIA OF MULTIPLE SITES: ICD-10-CM

## 2024-06-17 DIAGNOSIS — Z00.00 ENCOUNTER FOR ANNUAL WELLNESS VISIT (AWV) IN MEDICARE PATIENT: Primary | ICD-10-CM

## 2024-06-17 DIAGNOSIS — R63.4 WEIGHT LOSS: ICD-10-CM

## 2024-06-17 DIAGNOSIS — E78.2 MIXED HYPERLIPIDEMIA: ICD-10-CM

## 2024-06-17 DIAGNOSIS — F02.B11 MODERATE LATE ONSET ALZHEIMER'S DEMENTIA WITH AGITATION (HCC): ICD-10-CM

## 2024-06-17 DIAGNOSIS — K59.01 SLOW TRANSIT CONSTIPATION: ICD-10-CM

## 2024-06-17 DIAGNOSIS — E44.1 MILD PROTEIN-CALORIE MALNUTRITION (HCC): ICD-10-CM

## 2024-06-17 DIAGNOSIS — I10 PRIMARY HYPERTENSION: ICD-10-CM

## 2024-06-17 PROCEDURE — 99214 OFFICE O/P EST MOD 30 MIN: CPT

## 2024-06-17 PROCEDURE — 90677 PCV20 VACCINE IM: CPT

## 2024-06-17 PROCEDURE — G0439 PPPS, SUBSEQ VISIT: HCPCS

## 2024-06-17 PROCEDURE — G0009 ADMIN PNEUMOCOCCAL VACCINE: HCPCS

## 2024-06-17 RX ORDER — ATORVASTATIN CALCIUM 10 MG/1
10 TABLET, FILM COATED ORAL DAILY
Qty: 90 TABLET | Refills: 3 | Status: SHIPPED | OUTPATIENT
Start: 2024-06-17

## 2024-06-17 RX ORDER — ZOSTER VACCINE RECOMBINANT, ADJUVANTED 50 MCG/0.5
0.5 KIT INTRAMUSCULAR ONCE
Qty: 1 EACH | Refills: 1 | Status: SHIPPED | OUTPATIENT
Start: 2024-06-17 | End: 2024-06-17

## 2024-06-17 RX ORDER — SENNOSIDES 8.6 MG
8.6 TABLET ORAL DAILY
Qty: 90 TABLET | Refills: 3 | Status: SHIPPED | OUTPATIENT
Start: 2024-06-17

## 2024-06-17 NOTE — ASSESSMENT & PLAN NOTE
Wt Readings from Last 3 Encounters:   06/17/24 41.4 kg (91 lb 4.8 oz)   12/14/23 49.4 kg (109 lb)   12/06/23 49.1 kg (108 lb 3.2 oz)     Son reports unintentional weight loss over the last several months. He is not sure why. She did have some teeth extracted but he feels as though she is still eating the same. She eats three meals per day. No consitutional/GI symptoms as per ROS.    - Recommend supplementing with ensure. Check labs.

## 2024-06-17 NOTE — PROGRESS NOTES
Ambulatory Visit  Name: Liseth Guzman      : 1955      MRN: [id number]  Encounter Provider: BERNARD Ferro  Encounter Date: 2024   Encounter department: [facility]    Assessment & Plan   1. Encounter for annual wellness visit (AWV) in Medicare patient  2. Primary hypertension  Assessment & Plan:  BP Readings from Last 3 Encounters:   24 121/66   23 110/80   23 132/82     - Continue lisinopril 5 mg daily   3. Osteopenia of multiple sites  Assessment & Plan:  Continue calcium-vit D  Orders:  -     Calcium Carb-Cholecalciferol (calcium carbonate-vitamin D) 500 mg-5 mcg tablet; Take 1 tablet by mouth 2 (two) times a day with meals  4. Slow transit constipation  Assessment & Plan:  Continue senna  Orders:  -     senna (SENOKOT) 8.6 mg; Take 1 tablet (8.6 mg total) by mouth daily  5. Weight loss  Assessment & Plan:  Wt Readings from Last 3 Encounters:   24 41.4 kg (91 lb 4.8 oz)   23 49.4 kg (109 lb)   23 49.1 kg (108 lb 3.2 oz)     Son reports unintentional weight loss over the last several months. He is not sure why. She did have some teeth extracted but he feels as though she is still eating the same. She eats three meals per day. No consitutional/GI symptoms as per ROS.    - Recommend supplementing with ensure. Check labs.  Orders:  -     CBC and differential; Future  -     Comprehensive metabolic panel; Future  -     UA (URINE) with reflex to Scope  -     TSH, 3rd generation with Free T4 reflex; Future  -     HIV 1/2 AG/AB w Reflex [facility] for 2 yr old and above; Future  -     Hepatitis C antibody; Future  -     Hemoglobin A1C; Future  6. Abnormal finding of blood chemistry, unspecified  -     Hemoglobin A1C; Future  7. Mixed hyperlipidemia  -     atorvastatin (LIPITOR) 10 mg tablet; Take 1 tablet (10 mg total) by mouth daily  8. Mild protein-calorie malnutrition (HCC)  9. Moderate late onset Alzheimer's dementia  with agitation (HCC)  Assessment & Plan:  - continue f/u geriatric medicine     10. Encounter for immunization  -     Zoster Vac Recomb Adjuvanted (Shingrix) 50 MCG/0.5ML SUSR; Inject 0.5 mL into a muscle once for 1 dose Repeat dose in 2 to 6 months  -     RSVPreF3 Vac Recomb Adjuvanted 120 MCG/0.5ML SUSR; Inject 0.5 mL into a muscle 1 (one) time for 1 dose  -     Pneumococcal Conjugate Vaccine 20-valent (Pcv20)       Preventive health issues were discussed with patient, and age appropriate screening tests were ordered as noted in patient's After Visit Summary. Personalized health advice and appropriate referrals for health education or preventive services given if needed, as noted in patient's After Visit Summary.    History of Present Illness     Liseth Guzman is a 69 y.o. female  has a past medical history of Moderate late onset Alzheimer's dementia with agitation (HCC), No known health problems, Osteopenia of multiple sites, Primary hypertension, and Slow transit constipation.  has a past surgical history that includes Appendectomy (2010).      She presents today with son for an AWV.         Patient Care Team:  BERNARD Ferro as PCP - General (Nurse Practitioner)  Valerio Leonard MD as PCP - PCP-[facility] ([facility])    Review of Systems   Constitutional:  Positive for unexpected weight change. Negative for chills and fever.   HENT:  Negative for ear pain and sore throat.    Eyes:  Negative for pain and visual disturbance.   Respiratory:  Negative for cough and shortness of breath.    Cardiovascular:  Negative for chest pain and palpitations.   Gastrointestinal:  Negative for abdominal pain and vomiting.   Genitourinary:  Negative for dysuria and hematuria.   Musculoskeletal:  Negative for arthralgias and back pain.   Skin:  Negative for color change and rash.   Neurological:  Negative for seizures and syncope.   All other systems reviewed and are negative.    Medical History Reviewed by  provider this encounter:  Tobacco  Allergies  Meds  Problems  Med Hx  Surg Hx  Fam Hx       Annual Wellness Visit Questionnaire       Health Risk Assessment:   Patient rates overall health as good. Patient feels that their physical health rating is same. Patient is satisfied with their life. Eyesight was rated as same. Hearing was rated as same. Patient feels that their emotional and mental health rating is same. Patients states they are never, rarely angry. Patient states they are never, rarely unusually tired/fatigued. Pain experienced in the last 7 days has been none. Patient states that she has experienced no weight loss or gain in last 6 months.     Depression Screening:   PHQ-2 Score: 0      Fall Risk Screening:   In the past year, patient has experienced: no history of falling in past year      Urinary Incontinence Screening:   Patient has not leaked urine accidently in the last six months.     Home Safety:  Patient has trouble with stairs inside or outside of their home. Patient has working smoke alarms and has working carbon monoxide detector. Home safety hazards include: none.     Nutrition:   Current diet is Regular.     Medications:   Patient is not currently taking any over-the-counter supplements. Patient is not able to manage medications.     Activities of Daily Living (ADLs)/Instrumental Activities of Daily Living (IADLs):   Walk and transfer into and out of bed and chair?: Yes  Dress and groom yourself?: Yes    Bathe or shower yourself?: Yes    Feed yourself? Yes  Do your laundry/housekeeping?: No  Manage your money, pay your bills and track your expenses?: No  Make your own meals?: No    Do your own shopping?: No    ADL comments: Pt's son helps with daily living tasks     Previous Hospitalizations:   Any hospitalizations or ED visits within the last 12 months?: No      Advance Care Planning:   Living will: No    Durable POA for healthcare: No    Advanced directive: No      PREVENTIVE  SCREENINGS      Cardiovascular Screening:    General: Screening Not Indicated and History Lipid Disorder      Diabetes Screening:     General: Screening Current      Colorectal Cancer Screening:     General: Screening Current      Cervical Cancer Screening:    General: Screening Not Indicated      Lung Cancer Screening:     General: Screening Not Indicated      Hepatitis C Screening:    General: Screening Current    Screening, Brief Intervention, and Referral to Treatment (SBIRT)    Screening  Typical number of drinks in a day: 0  Typical number of drinks in a week: 0  Interpretation: Low risk drinking behavior.    AUDIT-C Screenin) How often did you have a drink containing alcohol in the past year? never  2) How many drinks did you have on a typical day when you were drinking in the past year? 0  3) How often did you have 6 or more drinks on one occasion in the past year? never    AUDIT-C Score: 0  Interpretation: Score 0-2 (female): Negative screen for alcohol misuse    Single Item Drug Screening:  How often have you used an illegal drug (including marijuana) or a prescription medication for non-medical reasons in the past year? never    Single Item Drug Screen Score: 0  Interpretation: Negative screen for possible drug use disorder    Social Determinants of Health     Financial Resource Strain: Low Risk  (2024)    Overall Financial Resource Strain (CARDIA)     Difficulty of Paying Living Expenses: Not hard at all   Food Insecurity: No Food Insecurity (2024)    Hunger Vital Sign     Worried About Running Out of Food in the Last Year: Never true     Ran Out of Food in the Last Year: Never true   Transportation Needs: No Transportation Needs (2024)    PRAPARE - Transportation     Lack of Transportation (Medical): No     Lack of Transportation (Non-Medical): No   Housing Stability: Unknown (2024)    Housing Stability Vital Sign     Unable to Pay for Housing in the Last Year: No     Homeless  "in the Last Year: No   Utilities: Not At Risk (6/16/2024)    [facility] Utilities     Threatened with loss of utilities: No     No results found.    Objective     /66 (BP Location: Left arm, Patient Position: Sitting, Cuff Size: Standard)   Pulse 55   Temp 98.2 °F (36.8 °C) (Temporal)   Resp 16   Ht 4' 7.5\" (1.41 m)   Wt 41.4 kg (91 lb 4.8 oz)   SpO2 99%   BMI 20.84 kg/m²     Physical Exam  Vitals and nursing note reviewed.   Constitutional:       General: She is not in acute distress.     Appearance: Normal appearance. She is well-developed.   HENT:      Head: Normocephalic and atraumatic.      Right Ear: External ear normal.      Left Ear: External ear normal.      Nose: Nose normal.   Eyes:      Extraocular Movements: Extraocular movements intact.      Conjunctiva/sclera: Conjunctivae normal.      Pupils: Pupils are equal, round, and reactive to light.   Cardiovascular:      Rate and Rhythm: Normal rate and regular rhythm.      Pulses: Normal pulses.      Heart sounds: Normal heart sounds. No murmur heard.  Pulmonary:      Effort: Pulmonary effort is normal. No respiratory distress.      Breath sounds: Normal breath sounds.   Abdominal:      General: Bowel sounds are normal.      Palpations: Abdomen is soft.      Tenderness: There is no abdominal tenderness.   Musculoskeletal:         General: Normal range of motion.      Cervical back: Normal range of motion and neck supple.   Skin:     General: Skin is warm and dry.      Capillary Refill: Capillary refill takes less than 2 seconds.   Neurological:      General: No focal deficit present.      Mental Status: She is alert and oriented to person, place, and time. Mental status is at baseline.   Psychiatric:         Mood and Affect: Mood normal.         Behavior: Behavior normal.         Thought Content: Thought content normal.         Judgment: Judgment normal.       Administrative Statements           "

## 2024-06-17 NOTE — PATIENT INSTRUCTIONS
[phone]: mammografia    Medicare Preventive Visit Patient Instructions  Thank you for completing your Welcome to Medicare Visit or Medicare Annual Wellness Visit today. Your next wellness visit will be due in one year (6/18/2025).  The screening/preventive services that you may require over the next 5-10 years are detailed below. Some tests may not apply to you based off risk factors and/or age. Screening tests ordered at today's visit but not completed yet may show as past due. Also, please note that scanned in results may not display below.  Preventive Screenings:  Service Recommendations Previous Testing/Comments   Colorectal Cancer Screening  * Colonoscopy    * Fecal Occult Blood Test (FOBT)/Fecal Immunochemical Test (FIT)  * Fecal DNA/Cologuard Test  * Flexible Sigmoidoscopy Age: 45-75 years old   Colonoscopy: every 10 years (may be performed more frequently if at higher risk)  OR  FOBT/FIT: every 1 year  OR  Cologuard: every 3 years  OR  Sigmoidoscopy: every 5 years  Screening may be recommended earlier than age 45 if at higher risk for colorectal cancer. Also, an individualized decision between you and your healthcare provider will decide whether screening between the ages of 76-85 would be appropriate. Colonoscopy: 07/18/2022  FOBT/FIT: Not on file  Cologuard: Not on file  Sigmoidoscopy: Not on file          Breast Cancer Screening Age: 40+ years old  Frequency: every 1-2 years  Not required if history of left and right mastectomy Mammogram: 05/09/2022        Cervical Cancer Screening Between the ages of 21-29, pap smear recommended once every 3 years.   Between the ages of 30-65, can perform pap smear with HPV co-testing every 5 years.   Recommendations may differ for women with a history of total hysterectomy, cervical cancer, or abnormal pap smears in past. Pap Smear: 05/19/2022        Hepatitis C Screening Once for adults born between 1945 and 1965  More frequently in patients at high risk for  Hepatitis C Hep C Antibody: 07/14/2022        Diabetes Screening 1-2 times per year if you're at risk for diabetes or have pre-diabetes Fasting glucose: 81 mg/dL (6/23/2023)  A1C: 5.6 % (7/14/2022)      Cholesterol Screening Once every 5 years if you don't have a lipid disorder. May order more often based on risk factors. Lipid panel: 06/23/2023          Other Preventive Screenings Covered by Medicare:  Abdominal Aortic Aneurysm (AAA) Screening: covered once if your at risk. You're considered to be at risk if you have a family history of AAA.  Lung Cancer Screening: covers low dose CT scan once per year if you meet all of the following conditions: (1) Age 55-77; (2) No signs or symptoms of lung cancer; (3) Current smoker or have quit smoking within the last 15 years; (4) You have a tobacco smoking history of at least 20 pack years (packs per day multiplied by number of years you smoked); (5) You get a written order from a healthcare provider.  Glaucoma Screening: covered annually if you're considered high risk: (1) You have diabetes OR (2) Family history of glaucoma OR (3)  aged 50 and older OR (4)  American aged 65 and older  Osteoporosis Screening: covered every 2 years if you meet one of the following conditions: (1) You're estrogen deficient and at risk for osteoporosis based off medical history and other findings; (2) Have a vertebral abnormality; (3) On glucocorticoid therapy for more than 3 months; (4) Have primary hyperparathyroidism; (5) On osteoporosis medications and need to assess response to drug therapy.   Last bone density test (DXA Scan): 06/20/2022.  HIV Screening: covered annually if you're between the age of 15-65. Also covered annually if you are younger than 15 and older than 65 with risk factors for HIV infection. For pregnant patients, it is covered up to 3 times per pregnancy.    Immunizations:  Immunization Recommendations   Influenza Vaccine Annual influenza  vaccination during flu season is recommended for all persons aged >= 6 months who do not have contraindications   Pneumococcal Vaccine   * Pneumococcal conjugate vaccine = PCV13 (Prevnar 13), PCV15 (Vaxneuvance), PCV20 (Prevnar 20)  * Pneumococcal polysaccharide vaccine = PPSV23 (Pneumovax) Adults 19-65 yo with certain risk factors or if 65+ yo  If never received any pneumonia vaccine: recommend Prevnar 20 (PCV20)  Give PCV20 if previously received 1 dose of PCV13 or PPSV23   Hepatitis B Vaccine 3 dose series if at intermediate or high risk (ex: diabetes, end stage renal disease, liver disease)   Respiratory syncytial virus (RSV) Vaccine - COVERED BY MEDICARE PART D  * RSVPreF3 (Arexvy) CDC recommends that adults 60 years of age and older may receive a single dose of RSV vaccine using shared clinical decision-making (SCDM)   Tetanus (Td) Vaccine - COST NOT COVERED BY MEDICARE PART B Following completion of primary series, a booster dose should be given every 10 years to maintain immunity against tetanus. Td may also be given as tetanus wound prophylaxis.   Tdap Vaccine - COST NOT COVERED BY MEDICARE PART B Recommended at least once for all adults. For pregnant patients, recommended with each pregnancy.   Shingles Vaccine (Shingrix) - COST NOT COVERED BY MEDICARE PART B  2 shot series recommended in those 19 years and older who have or will have weakened immune systems or those 50 years and older     Health Maintenance Due:      Topic Date Due    Breast Cancer Screening: Mammogram  05/09/2023    Colorectal Cancer Screening  07/17/2025    Hepatitis C Screening  Completed     Immunizations Due:      Topic Date Due    Pneumococcal Vaccine: 65+ Years (2 of 2 - PCV) 03/17/2023    COVID-19 Vaccine (2 - 2023-24 season) 09/01/2023     Advance Directives   What are advance directives?  Advance directives are legal documents that state your wishes and plans for medical care. These plans are made ahead of time in case you lose  your ability to make decisions for yourself. Advance directives can apply to any medical decision, such as the treatments you want, and if you want to donate organs.   What are the types of advance directives?  There are many types of advance directives, and each state has rules about how to use them. You may choose a combination of any of the following:  Living will:  This is a written record of the treatment you want. You can also choose which treatments you do not want, which to limit, and which to stop at a certain time. This includes surgery, medicine, IV fluid, and tube feedings.   Durable power of  for healthcare (DPAHC):  This is a written record that states who you want to make healthcare choices for you when you are unable to make them for yourself. This person, called a proxy, is usually a family member or a friend. You may choose more than 1 proxy.  Do not resuscitate (DNR) order:  A DNR order is used in case your heart stops beating or you stop breathing. It is a request not to have certain forms of treatment, such as CPR. A DNR order may be included in other types of advance directives.  Medical directive:  This covers the care that you want if you are in a coma, near death, or unable to make decisions for yourself. You can list the treatments you want for each condition. Treatment may include pain medicine, surgery, blood transfusions, dialysis, IV or tube feedings, and a ventilator (breathing machine).  Values history:  This document has questions about your views, beliefs, and how you feel and think about life. This information can help others choose the care that you would choose.  Why are advance directives important?  An advance directive helps you control your care. Although spoken wishes may be used, it is better to have your wishes written down. Spoken wishes can be misunderstood, or not followed. Treatments may be given even if you do not want them. An advance directive may make it  easier for your family to make difficult choices about your care.       © Copyright [employer] 2018 Information is for End User's use only and may not be sold, redistributed or otherwise used for commercial purposes. All illustrations and images included in CareNotes® are the copyrighted property of A.D.A.M., Inc. or [employer]

## 2024-06-17 NOTE — ASSESSMENT & PLAN NOTE
BP Readings from Last 3 Encounters:   06/17/24 121/66   12/14/23 110/80   12/06/23 132/82     - Continue lisinopril 5 mg daily

## 2024-06-24 ENCOUNTER — TELEPHONE (OUTPATIENT)
Dept: FAMILY MEDICINE CLINIC | Facility: CLINIC | Age: 69
End: 2024-06-24

## 2024-06-24 ENCOUNTER — APPOINTMENT (OUTPATIENT)
Dept: LAB | Facility: CLINIC | Age: 69
End: 2024-06-24
Payer: COMMERCIAL

## 2024-06-24 DIAGNOSIS — R63.4 WEIGHT LOSS: ICD-10-CM

## 2024-06-24 DIAGNOSIS — Z13.220 SCREENING CHOLESTEROL LEVEL: ICD-10-CM

## 2024-06-24 DIAGNOSIS — R79.9 ABNORMAL FINDING OF BLOOD CHEMISTRY, UNSPECIFIED: ICD-10-CM

## 2024-06-24 LAB
ALBUMIN SERPL BCG-MCNC: 3.7 G/DL (ref 3.5–5)
ALP SERPL-CCNC: 52 U/L (ref 34–104)
ALT SERPL W P-5'-P-CCNC: 25 U/L (ref 7–52)
AMORPH URATE CRY URNS QL MICRO: ABNORMAL
ANION GAP SERPL CALCULATED.3IONS-SCNC: 8 MMOL/L (ref 4–13)
AST SERPL W P-5'-P-CCNC: 23 U/L (ref 13–39)
BACTERIA UR QL AUTO: ABNORMAL /HPF
BASOPHILS # BLD AUTO: 0.05 THOUSANDS/ÂΜL (ref 0–0.1)
BASOPHILS NFR BLD AUTO: 1 % (ref 0–1)
BILIRUB SERPL-MCNC: 0.55 MG/DL (ref 0.2–1)
BILIRUB UR QL STRIP: NEGATIVE
BUN SERPL-MCNC: 19 MG/DL (ref 5–25)
CALCIUM SERPL-MCNC: 9.1 MG/DL (ref 8.4–10.2)
CHLORIDE SERPL-SCNC: 107 MMOL/L (ref 96–108)
CHOLEST SERPL-MCNC: 131 MG/DL
CLARITY UR: ABNORMAL
CO2 SERPL-SCNC: 28 MMOL/L (ref 21–32)
COLOR UR: YELLOW
CREAT SERPL-MCNC: 0.58 MG/DL (ref 0.6–1.3)
EOSINOPHIL # BLD AUTO: 0.09 THOUSAND/ÂΜL (ref 0–0.61)
EOSINOPHIL NFR BLD AUTO: 2 % (ref 0–6)
ERYTHROCYTE [DISTWIDTH] IN BLOOD BY AUTOMATED COUNT: 13.6 % (ref 11.6–15.1)
GFR SERPL CREATININE-BSD FRML MDRD: 93 ML/MIN/1.73SQ M
GLUCOSE P FAST SERPL-MCNC: 85 MG/DL (ref 65–99)
GLUCOSE UR STRIP-MCNC: NEGATIVE MG/DL
HCT VFR BLD AUTO: 41.6 % (ref 34.8–46.1)
HCV AB SER QL: NORMAL
HDLC SERPL-MCNC: 51 MG/DL
HGB BLD-MCNC: 13.7 G/DL (ref 11.5–15.4)
HGB UR QL STRIP.AUTO: NEGATIVE
IMM GRANULOCYTES # BLD AUTO: 0.01 THOUSAND/UL (ref 0–0.2)
IMM GRANULOCYTES NFR BLD AUTO: 0 % (ref 0–2)
KETONES UR STRIP-MCNC: NEGATIVE MG/DL
LDLC SERPL CALC-MCNC: 64 MG/DL (ref 0–100)
LEUKOCYTE ESTERASE UR QL STRIP: NEGATIVE
LYMPHOCYTES # BLD AUTO: 1.7 THOUSANDS/ÂΜL (ref 0.6–4.47)
LYMPHOCYTES NFR BLD AUTO: 36 % (ref 14–44)
MCH RBC QN AUTO: 31.8 PG (ref 26.8–34.3)
MCHC RBC AUTO-ENTMCNC: 32.9 G/DL (ref 31.4–37.4)
MCV RBC AUTO: 97 FL (ref 82–98)
MONOCYTES # BLD AUTO: 0.45 THOUSAND/ÂΜL (ref 0.17–1.22)
MONOCYTES NFR BLD AUTO: 9 % (ref 4–12)
MUCOUS THREADS UR QL AUTO: ABNORMAL
NEUTROPHILS # BLD AUTO: 2.48 THOUSANDS/ÂΜL (ref 1.85–7.62)
NEUTS SEG NFR BLD AUTO: 52 % (ref 43–75)
NITRITE UR QL STRIP: NEGATIVE
NON-SQ EPI CELLS URNS QL MICRO: ABNORMAL /HPF
NONHDLC SERPL-MCNC: 80 MG/DL
NRBC BLD AUTO-RTO: 0 /100 WBCS
PH UR STRIP.AUTO: 7.5 [PH]
PLATELET # BLD AUTO: 209 THOUSANDS/UL (ref 149–390)
PMV BLD AUTO: 11.9 FL (ref 8.9–12.7)
POTASSIUM SERPL-SCNC: 4.2 MMOL/L (ref 3.5–5.3)
PROT SERPL-MCNC: 6.5 G/DL (ref 6.4–8.4)
PROT UR STRIP-MCNC: ABNORMAL MG/DL
RBC # BLD AUTO: 4.31 MILLION/UL (ref 3.81–5.12)
RBC #/AREA URNS AUTO: ABNORMAL /HPF
SODIUM SERPL-SCNC: 143 MMOL/L (ref 135–147)
SP GR UR STRIP.AUTO: 1.02 (ref 1–1.03)
TRIGL SERPL-MCNC: 78 MG/DL
TSH SERPL DL<=0.05 MIU/L-ACNC: 1.18 UIU/ML (ref 0.45–4.5)
UROBILINOGEN UR STRIP-ACNC: <2 MG/DL
WBC # BLD AUTO: 4.78 THOUSAND/UL (ref 4.31–10.16)
WBC #/AREA URNS AUTO: ABNORMAL /HPF

## 2024-06-24 PROCEDURE — 80061 LIPID PANEL: CPT

## 2024-06-24 PROCEDURE — 83036 HEMOGLOBIN GLYCOSYLATED A1C: CPT

## 2024-06-24 PROCEDURE — 87389 HIV-1 AG W/HIV-1&-2 AB AG IA: CPT

## 2024-06-24 PROCEDURE — 80053 COMPREHEN METABOLIC PANEL: CPT

## 2024-06-24 PROCEDURE — 85025 COMPLETE CBC W/AUTO DIFF WBC: CPT

## 2024-06-24 PROCEDURE — 81001 URINALYSIS AUTO W/SCOPE: CPT

## 2024-06-24 PROCEDURE — 84443 ASSAY THYROID STIM HORMONE: CPT

## 2024-06-24 PROCEDURE — 36415 COLL VENOUS BLD VENIPUNCTURE: CPT

## 2024-06-24 PROCEDURE — 86803 HEPATITIS C AB TEST: CPT

## 2024-06-25 LAB
EST. AVERAGE GLUCOSE BLD GHB EST-MCNC: 120 MG/DL
HBA1C MFR BLD: 5.8 %
HIV 1+2 AB+HIV1 P24 AG SERPL QL IA: NORMAL
HIV 2 AB SERPL QL IA: NORMAL
HIV1 AB SERPL QL IA: NORMAL
HIV1 P24 AG SERPL QL IA: NORMAL

## 2024-06-27 DIAGNOSIS — E44.1 MILD PROTEIN-CALORIE MALNUTRITION (HCC): Primary | ICD-10-CM

## 2024-06-27 DIAGNOSIS — F02.B11 MODERATE LATE ONSET ALZHEIMER'S DEMENTIA WITH AGITATION (HCC): Chronic | ICD-10-CM

## 2024-06-27 DIAGNOSIS — G30.1 MODERATE LATE ONSET ALZHEIMER'S DEMENTIA WITH AGITATION (HCC): Chronic | ICD-10-CM

## 2024-06-27 RX ORDER — LACTOSE-REDUCED FOOD
1 LIQUID (ML) ORAL 2 TIMES DAILY
Qty: 237 ML | Refills: 100 | Status: SHIPPED | OUTPATIENT
Start: 2024-06-27

## 2024-06-27 NOTE — TELEPHONE ENCOUNTER
Can you please order the ensures you choose the patient to take, print and sign and hand to me so that I may fax to a DME supplier.   
I am attempting to contact the insurance company but have you prescribed ensures for the patient as of yet?   
Pts son came into office and is requesting if a prior auth can be started for pt to receive Ensures. Pts son was told from insurance a prior auth is needed for ensures to be covered.       Any question please contact pt sons, thank you !  
7

## 2024-07-01 ENCOUNTER — TELEPHONE (OUTPATIENT)
Dept: FAMILY MEDICINE CLINIC | Facility: CLINIC | Age: 69
End: 2024-07-01

## 2024-08-28 ENCOUNTER — OFFICE VISIT (OUTPATIENT)
Age: 69
End: 2024-08-28
Payer: COMMERCIAL

## 2024-08-28 VITALS
OXYGEN SATURATION: 99 % | TEMPERATURE: 96.8 F | BODY MASS INDEX: 20.92 KG/M2 | WEIGHT: 90.4 LBS | HEIGHT: 55 IN | DIASTOLIC BLOOD PRESSURE: 62 MMHG | SYSTOLIC BLOOD PRESSURE: 106 MMHG | HEART RATE: 63 BPM

## 2024-08-28 DIAGNOSIS — I10 PRIMARY HYPERTENSION: ICD-10-CM

## 2024-08-28 DIAGNOSIS — G30.1 MODERATE LATE ONSET ALZHEIMER'S DEMENTIA WITH PSYCHOTIC DISTURBANCE (HCC): Primary | ICD-10-CM

## 2024-08-28 DIAGNOSIS — K59.01 SLOW TRANSIT CONSTIPATION: ICD-10-CM

## 2024-08-28 DIAGNOSIS — Z91.81 AT MODERATE RISK FOR FALL: ICD-10-CM

## 2024-08-28 DIAGNOSIS — F02.B2 MODERATE LATE ONSET ALZHEIMER'S DEMENTIA WITH PSYCHOTIC DISTURBANCE (HCC): Primary | ICD-10-CM

## 2024-08-28 DIAGNOSIS — E44.1 MILD PROTEIN-CALORIE MALNUTRITION (HCC): ICD-10-CM

## 2024-08-28 DIAGNOSIS — K13.79 ORAL PAIN: ICD-10-CM

## 2024-08-28 PROCEDURE — 99483 ASSMT & CARE PLN PT COG IMP: CPT | Performed by: INTERNAL MEDICINE

## 2024-08-28 RX ORDER — QUETIAPINE FUMARATE 25 MG/1
12.5 TABLET, FILM COATED ORAL
Qty: 45 TABLET | Refills: 3 | Status: SHIPPED | OUTPATIENT
Start: 2024-08-28

## 2024-08-28 NOTE — ASSESSMENT & PLAN NOTE
With weight loss (previously 114 lb in '22, now down to 90 lb) and difficulty with swallowing.  Likely related to increasing delusions and anxiety related to Alzheimer's.  Will start with antipsychotic therapy and stop statin therapy.

## 2024-08-28 NOTE — PROGRESS NOTES
ASSESSMENT AND PLAN:  1. 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  Start quetiapine at 12.5 mg at bedtime and monitor symptoms.  May consider starting memantine therapy as this may assist with anxiety as well.    Decision-making capacity: Does not have capacity for financial or medical decisions     Staging: Moderate-Severe stage Alzheimer's dementia (FAST stage 6c)      Medications Review: reviewed current medications. Appropriate for current medical diagnoses     Caregiver Review: With some caregiver stress but reports being content with current setup, denies offer for pursuit of home health aid   Orders:  -     QUEtiapine (SEROquel) 25 mg tablet; Take 0.5 tablets (12.5 mg total) by mouth daily at bedtime  2. Mild protein-calorie malnutrition (HCC)  Assessment & Plan:  With weight loss (previously 114 lb in '22, now down to 90 lb) and difficulty with swallowing.  Likely related to increasing delusions and anxiety related to Alzheimer's.  Will start with antipsychotic therapy and stop statin therapy.  3. Primary hypertension  Assessment & Plan:  BP low today with weight loss.  Stop lisinopril.  4. Slow transit constipation  Assessment & Plan:  Continue with senna therapy.  5. Oral pain  Assessment & Plan:  With unusual feeling of foreign body sensation in the mouth.  Likely part of hallucinations/delusions related to Alzheimer's with increasing disorganized thought processes.  Appears that when she does decide to eat that she is able to eat okay, and complaints are outside of the times she is actually eating.  6. At moderate risk for fall  Assessment & Plan:  With out recent fall, but with moderate fall risk 2/2 dementia and increasing frailty and weight loss        HPI:    We had the pleasure of evaluating Liseth Guzman who is a 69 y.o. female in Geriatric consultation today.  Ms.  Megan is in the office with her son.  She lives with son Cirilo    HISTORY AS PER SON:    COGNITION UPDATE:  Son reports he is more aggressive, complains more, and with more delusions.  At times she complains about things in her mouth and throat and feeling panicked because of this.  Whenever they leave the home, she is always showing everyone her mouth.    FUNCTIONAL STATUS:  BADLs  Does patient require assistance with:  Bathing: Yes  Dressing: Yes  Transferring: No  Continence: No  Toileting: Yes  Feeding: No    IADLs  Dose patient require assistance with:  Telephone: Yes  Shopping: Yes  Food Preparation: Yes  Housekeeping: Yes  Laundry: Yes  Transportation: Yes  Medications: Yes  Finances: Yes    NEUROPSYCH SYMPTOMS:  Does patient get angry or hostile?  Resist care from others? Yes  Does patient see or hear things that no one else can see or hear? No  Does patient act impatient and cranky? Does mood frequently change for no apparent reason? No  Does patient act suspicious without good reason (example: believes that others are stealing from him or her, or planning to harm him or her in some way)? No  Does patient less interested in his or her usual activities or in the activities and plans of others? Yes  Does patient have trouble sleeping at night? Yes    SAFETY:  Hearing and vision issue: No  Any gait or balance disorder: Yes  Uses: no assistive devices  Any falls in the last year: No  Any history of wandering: No  Are there firearms or guns in the home: No  Does patient drive: No  Any driving accidents or citations in the last year: No  Any concerns about patient's ability to drive: Yes    ACP REVIEW:  Does patient have POA: No  Does patient have a Living will: No  Any legal assistance needed for healthcare planning?: Yes    Allergies   Allergen Reactions    Donepezil Other (See Comments)     nightmares       Medications:    Current Outpatient Medications on File Prior to Visit   Medication Sig Dispense  Refill    Calcium Carb-Cholecalciferol (calcium carbonate-vitamin D) 500 mg-5 mcg tablet Take 1 tablet by mouth 2 (two) times a day with meals 180 tablet 1    Nutritional Supplements (Ensure Nutrition Shake) LIQD Take 1 Bottle by mouth 2 (two) times a day 237 mL 100    senna (SENOKOT) 8.6 mg Take 1 tablet (8.6 mg total) by mouth daily 90 tablet 3    [DISCONTINUED] atorvastatin (LIPITOR) 10 mg tablet Take 1 tablet (10 mg total) by mouth daily 90 tablet 3    [DISCONTINUED] lisinopril (ZESTRIL) 5 mg tablet TAKE 1 TABLET (5 MG TOTAL) BY MOUTH DAILY. 90 tablet 1     No current facility-administered medications on file prior to visit.       History:  Past Medical History:   Diagnosis Date    Moderate late onset Alzheimer's dementia with agitation (HCC) 10/31/2022    No known health problems     Osteopenia of multiple sites 11/30/2022    Primary hypertension 11/30/2022    Slow transit constipation 05/16/2023     Past Surgical History:   Procedure Laterality Date    APPENDECTOMY  2010     Family History   Problem Relation Age of Onset    Breast cancer Neg Hx     Colon cancer Neg Hx     Ovarian cancer Neg Hx     Cancer Neg Hx      Social History     Socioeconomic History    Marital status:      Spouse name: Not on file    Number of children: Not on file    Years of education: Not on file    Highest education level: Not on file   Occupational History    Not on file   Tobacco Use    Smoking status: Never    Smokeless tobacco: Never   Vaping Use    Vaping status: Never Used   Substance and Sexual Activity    Alcohol use: Never    Drug use: Never    Sexual activity: Not Currently     Birth control/protection: Post-menopausal   Other Topics Concern    Not on file   Social History Narrative    Not on file     Social Determinants of Health     Financial Resource Strain: Low Risk  (6/16/2024)    Overall Financial Resource Strain (CARDIA)     Difficulty of Paying Living Expenses: Not hard at all   Food Insecurity: No Food  "Insecurity (6/16/2024)    Hunger Vital Sign     Worried About Running Out of Food in the Last Year: Never true     Ran Out of Food in the Last Year: Never true   Transportation Needs: No Transportation Needs (6/16/2024)    PRAPARE - Transportation     Lack of Transportation (Medical): No     Lack of Transportation (Non-Medical): No   Physical Activity: Not on file   Stress: Not on file   Social Connections: Not on file   Intimate Partner Violence: Not on file   Housing Stability: Unknown (6/16/2024)    Housing Stability Vital Sign     Unable to Pay for Housing in the Last Year: No     Number of Times Moved in the Last Year: Not on file     Homeless in the Last Year: No     Past Surgical History:   Procedure Laterality Date    APPENDECTOMY  2010       OBJECTIVE:  Vitals:    08/28/24 1003   BP: 106/62   BP Location: Left arm   Patient Position: Sitting   Cuff Size: Standard   Pulse: 63   Temp: (!) 96.8 °F (36 °C)   TempSrc: Temporal   SpO2: 99%   Weight: 41 kg (90 lb 6.4 oz)   Height: 4' 7\" (1.397 m)     Body mass index is 21.01 kg/m².  Physical Exam  Constitutional:       Comments: Withdrawn, sitting in the chair   HENT:      Head: Normocephalic and atraumatic.      Nose: Nose normal.      Mouth/Throat:      Mouth: Mucous membranes are moist.   Eyes:      General: No scleral icterus.        Right eye: No discharge.         Left eye: No discharge.      Conjunctiva/sclera: Conjunctivae normal.   Pulmonary:      Effort: Pulmonary effort is normal. No respiratory distress.   Abdominal:      General: There is no distension.   Skin:     Coloration: Skin is not pale.      Findings: No erythema.   Neurological:      General: No focal deficit present.      Mental Status: She is alert.      Gait: Gait abnormal (low step height).         5/16/23 MoCA: 4/30 (although unable to complete)       Labs & Imaging:  Lab Results   Component Value Date    WBC 4.78 06/24/2024    HGB 13.7 06/24/2024    HCT 41.6 06/24/2024    MCV 97 " 06/24/2024     06/24/2024     Lab Results   Component Value Date    SODIUM 143 06/24/2024    K 4.2 06/24/2024     06/24/2024    CO2 28 06/24/2024    AGAP 8 06/24/2024    BUN 19 06/24/2024    CREATININE 0.58 (L) 06/24/2024    GLUF 85 06/24/2024    CALCIUM 9.1 06/24/2024    AST 23 06/24/2024    ALT 25 06/24/2024    ALKPHOS 52 06/24/2024    TP 6.5 06/24/2024    TBILI 0.55 06/24/2024    EGFR 93 06/24/2024     Lab Results   Component Value Date    HGBA1C 5.8 (H) 06/24/2024     Lab Results   Component Value Date    CHOLESTEROL 131 06/24/2024    CHOLESTEROL 219 (H) 06/23/2023    CHOLESTEROL 216 (H) 07/14/2022     Lab Results   Component Value Date    HDL 51 06/24/2024    HDL 55 06/23/2023    HDL 61 07/14/2022     Lab Results   Component Value Date    TRIG 78 06/24/2024    TRIG 148 06/23/2023    TRIG 102 07/14/2022     Lab Results   Component Value Date    NONHDLC 80 06/24/2024    NONHDLC 155 07/14/2022     Lab Results   Component Value Date    LDLCALC 64 06/24/2024    LDLCALC 134 (H) 06/23/2023     Lab Results   Component Value Date    [id number] 431 07/14/2022     Lab Results   Component Value Date    NAE0FVEYSSCQ 1.179 06/24/2024     Lab Results   Component Value Date    RPR Non-Reactive 07/14/2022         Results for orders placed during the hospital encounter of 11/11/22    MRI brain wo contrast    Narrative  MRI BRAIN WITHOUT CONTRAST    INDICATION: R41.3: Other amnesia  R41.89: Other symptoms and signs involving cognitive functions and awareness.    COMPARISON:   None.    TECHNIQUE:  Multiplanar, multisequence imaging of the brain was performed.      IMAGE QUALITY:  Diagnostic.    FINDINGS:    BRAIN PARENCHYMA: Mild cerebral volume loss.  There is no discrete mass, mass effect or midline shift. There is no intracranial hemorrhage.  Diffusion imaging is unremarkable.    Very minimal nonspecific white matter change suggesting microangiopathy.    VENTRICLES:  Normal for the patient's age.    SELLA AND  PITUITARY GLAND:  Normal.    ORBITS:  Prior bilateral cataract surgery.    PARANASAL SINUSES:  Minimal ethmoidal air cells mucosal thickening.    VASCULATURE:  Evaluation of the major intracranial vasculature demonstrates appropriate flow voids.    CALVARIUM AND SKULL BASE:  Normal.    EXTRACRANIAL SOFT TISSUES:  Normal.    Impression  Mild cerebral volume loss.  Consider quantitative volumetric brain MRI (NEUROQUANT), if mesial temporal lobe focused neurodegenerative process is concerned.

## 2024-08-28 NOTE — PATIENT INSTRUCTIONS
ASSESSMENT AND PLAN:  1. 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  Start quetiapine at 12.5 mg at bedtime and monitor symptoms.  May consider starting memantine therapy as this may assist with anxiety as well.    Decision-making capacity: Does not have capacity for financial or medical decisions     Staging: Moderate-Severe stage Alzheimer's dementia (FAST stage 6c)      Medications Review: reviewed current medications. Appropriate for current medical diagnoses     Caregiver Review: With some caregiver stress but reports being content with current setup, denies offer for pursuit of home health aid   Orders:  -     QUEtiapine (SEROquel) 25 mg tablet; Take 0.5 tablets (12.5 mg total) by mouth daily at bedtime  2. Mild protein-calorie malnutrition (HCC)  Assessment & Plan:  With weight loss (previously 114 lb in '22, now down to 90 lb) and difficulty with swallowing.  Likely related to increasing delusions and anxiety related to Alzheimer's.  Will start with antipsychotic therapy and stop statin therapy.  3. Primary hypertension  Assessment & Plan:  BP low today with weight loss.  Stop lisinopril.  4. Slow transit constipation  Assessment & Plan:  Continue with senna therapy.  5. Oral pain  Assessment & Plan:  With unusual feeling of foreign body sensation in the mouth.  Likely part of hallucinations/delusions related to Alzheimer's with increasing disorganized thought processes.  Appears that when she does decide to eat that she is able to eat okay, and complaints are outside of the times she is actually eating.  6. At moderate risk for fall  Assessment & Plan:  With out recent fall, but with moderate fall risk 2/2 dementia and increasing frailty and weight loss        HPI:    We had the pleasure of evaluating Liseth Guzman who is a 69 y.o. female in Geriatric consultation today.  Ms.  Megan is in the office with her son.  She lives with son Cirilo    HISTORY AS PER SON:    COGNITION UPDATE:  Son reports he is more aggressive, complains more, and with more delusions.  At times she complains about things in her mouth and throat and feeling panicked because of this.  Whenever they leave the home, she is always showing everyone her mouth.    FUNCTIONAL STATUS:  BADLs  Does patient require assistance with:  Bathing: Yes  Dressing: Yes  Transferring: No  Continence: No  Toileting: Yes  Feeding: No    IADLs  Dose patient require assistance with:  Telephone: Yes  Shopping: Yes  Food Preparation: Yes  Housekeeping: Yes  Laundry: Yes  Transportation: Yes  Medications: Yes  Finances: Yes    NEUROPSYCH SYMPTOMS:  Does patient get angry or hostile?  Resist care from others? Yes  Does patient see or hear things that no one else can see or hear? No  Does patient act impatient and cranky? Does mood frequently change for no apparent reason? No  Does patient act suspicious without good reason (example: believes that others are stealing from him or her, or planning to harm him or her in some way)? No  Does patient less interested in his or her usual activities or in the activities and plans of others? Yes  Does patient have trouble sleeping at night? Yes    SAFETY:  Hearing and vision issue: No  Any gait or balance disorder: Yes  Uses: no assistive devices  Any falls in the last year: No  Any history of wandering: No  Are there firearms or guns in the home: No  Does patient drive: No  Any driving accidents or citations in the last year: No  Any concerns about patient's ability to drive: Yes    ACP REVIEW:  Does patient have POA: No  Does patient have a Living will: No  Any legal assistance needed for healthcare planning?: Yes    Allergies   Allergen Reactions    Donepezil Other (See Comments)     nightmares       Medications:    Current Outpatient Medications on File Prior to Visit   Medication Sig Dispense  Refill    Calcium Carb-Cholecalciferol (calcium carbonate-vitamin D) 500 mg-5 mcg tablet Take 1 tablet by mouth 2 (two) times a day with meals 180 tablet 1    Nutritional Supplements (Ensure Nutrition Shake) LIQD Take 1 Bottle by mouth 2 (two) times a day 237 mL 100    senna (SENOKOT) 8.6 mg Take 1 tablet (8.6 mg total) by mouth daily 90 tablet 3    [DISCONTINUED] atorvastatin (LIPITOR) 10 mg tablet Take 1 tablet (10 mg total) by mouth daily 90 tablet 3    [DISCONTINUED] lisinopril (ZESTRIL) 5 mg tablet TAKE 1 TABLET (5 MG TOTAL) BY MOUTH DAILY. 90 tablet 1     No current facility-administered medications on file prior to visit.       History:  Past Medical History:   Diagnosis Date    Moderate late onset Alzheimer's dementia with agitation (HCC) 10/31/2022    No known health problems     Osteopenia of multiple sites 11/30/2022    Primary hypertension 11/30/2022    Slow transit constipation 05/16/2023     Past Surgical History:   Procedure Laterality Date    APPENDECTOMY  2010     Family History   Problem Relation Age of Onset    Breast cancer Neg Hx     Colon cancer Neg Hx     Ovarian cancer Neg Hx     Cancer Neg Hx      Social History     Socioeconomic History    Marital status:      Spouse name: Not on file    Number of children: Not on file    Years of education: Not on file    Highest education level: Not on file   Occupational History    Not on file   Tobacco Use    Smoking status: Never    Smokeless tobacco: Never   Vaping Use    Vaping status: Never Used   Substance and Sexual Activity    Alcohol use: Never    Drug use: Never    Sexual activity: Not Currently     Birth control/protection: Post-menopausal   Other Topics Concern    Not on file   Social History Narrative    Not on file     Social Determinants of Health     Financial Resource Strain: Low Risk  (6/16/2024)    Overall Financial Resource Strain (CARDIA)     Difficulty of Paying Living Expenses: Not hard at all   Food Insecurity: No Food  "Insecurity (6/16/2024)    Hunger Vital Sign     Worried About Running Out of Food in the Last Year: Never true     Ran Out of Food in the Last Year: Never true   Transportation Needs: No Transportation Needs (6/16/2024)    PRAPARE - Transportation     Lack of Transportation (Medical): No     Lack of Transportation (Non-Medical): No   Physical Activity: Not on file   Stress: Not on file   Social Connections: Not on file   Intimate Partner Violence: Not on file   Housing Stability: Unknown (6/16/2024)    Housing Stability Vital Sign     Unable to Pay for Housing in the Last Year: No     Number of Times Moved in the Last Year: Not on file     Homeless in the Last Year: No     Past Surgical History:   Procedure Laterality Date    APPENDECTOMY  2010       OBJECTIVE:  Vitals:    08/28/24 1003   BP: 106/62   BP Location: Left arm   Patient Position: Sitting   Cuff Size: Standard   Pulse: 63   Temp: (!) 96.8 °F (36 °C)   TempSrc: Temporal   SpO2: 99%   Weight: 41 kg (90 lb 6.4 oz)   Height: 4' 7\" (1.397 m)     Body mass index is 21.01 kg/m².  Physical Exam  Constitutional:       Comments: Withdrawn, sitting in the chair   HENT:      Head: Normocephalic and atraumatic.      Nose: Nose normal.      Mouth/Throat:      Mouth: Mucous membranes are moist.   Eyes:      General: No scleral icterus.        Right eye: No discharge.         Left eye: No discharge.      Conjunctiva/sclera: Conjunctivae normal.   Pulmonary:      Effort: Pulmonary effort is normal. No respiratory distress.   Abdominal:      General: There is no distension.   Skin:     Coloration: Skin is not pale.      Findings: No erythema.   Neurological:      General: No focal deficit present.      Mental Status: She is alert.      Gait: Gait abnormal (low step height).         5/16/23 MoCA: 4/30 (although unable to complete)       Labs & Imaging:  Lab Results   Component Value Date    WBC 4.78 06/24/2024    HGB 13.7 06/24/2024    HCT 41.6 06/24/2024    MCV 97 " 06/24/2024     06/24/2024     Lab Results   Component Value Date    SODIUM 143 06/24/2024    K 4.2 06/24/2024     06/24/2024    CO2 28 06/24/2024    AGAP 8 06/24/2024    BUN 19 06/24/2024    CREATININE 0.58 (L) 06/24/2024    GLUF 85 06/24/2024    CALCIUM 9.1 06/24/2024    AST 23 06/24/2024    ALT 25 06/24/2024    ALKPHOS 52 06/24/2024    TP 6.5 06/24/2024    TBILI 0.55 06/24/2024    EGFR 93 06/24/2024     Lab Results   Component Value Date    HGBA1C 5.8 (H) 06/24/2024     Lab Results   Component Value Date    CHOLESTEROL 131 06/24/2024    CHOLESTEROL 219 (H) 06/23/2023    CHOLESTEROL 216 (H) 07/14/2022     Lab Results   Component Value Date    HDL 51 06/24/2024    HDL 55 06/23/2023    HDL 61 07/14/2022     Lab Results   Component Value Date    TRIG 78 06/24/2024    TRIG 148 06/23/2023    TRIG 102 07/14/2022     Lab Results   Component Value Date    NONHDLC 80 06/24/2024    NONHDLC 155 07/14/2022     Lab Results   Component Value Date    LDLCALC 64 06/24/2024    LDLCALC 134 (H) 06/23/2023     Lab Results   Component Value Date    [id number] 431 07/14/2022     Lab Results   Component Value Date    [id number] 1.179 06/24/2024     Lab Results   Component Value Date    RPR Non-Reactive 07/14/2022         Results for orders placed during the hospital encounter of 11/11/22    MRI brain wo contrast    Narrative  MRI BRAIN WITHOUT CONTRAST    INDICATION: R41.3: Other amnesia  R41.89: Other symptoms and signs involving cognitive functions and awareness.    COMPARISON:   None.    TECHNIQUE:  Multiplanar, multisequence imaging of the brain was performed.      IMAGE QUALITY:  Diagnostic.    FINDINGS:    BRAIN PARENCHYMA: Mild cerebral volume loss.  There is no discrete mass, mass effect or midline shift. There is no intracranial hemorrhage.  Diffusion imaging is unremarkable.    Very minimal nonspecific white matter change suggesting microangiopathy.    VENTRICLES:  Normal for the patient's age.    SELLA AND  PITUITARY GLAND:  Normal.    ORBITS:  Prior bilateral cataract surgery.    PARANASAL SINUSES:  Minimal ethmoidal air cells mucosal thickening.    VASCULATURE:  Evaluation of the major intracranial vasculature demonstrates appropriate flow voids.    CALVARIUM AND SKULL BASE:  Normal.    EXTRACRANIAL SOFT TISSUES:  Normal.    Impression  Mild cerebral volume loss.  Consider quantitative volumetric brain MRI (NEUROQUANT), if mesial temporal lobe focused neurodegenerative process is concerned.

## 2024-08-28 NOTE — ASSESSMENT & PLAN NOTE
With increasing delusions and disorganized thought processes.  Start quetiapine at 12.5 mg at bedtime and monitor symptoms.  May consider starting memantine therapy as this may assist with anxiety as well.    Decision-making capacity: Does not have capacity for financial or medical decisions     Staging: Moderate-Severe stage Alzheimer's dementia (FAST stage 6c)      Medications Review: reviewed current medications. Appropriate for current medical diagnoses     Caregiver Review: With some caregiver stress but reports being content with current setup, denies offer for pursuit of home health aid

## 2024-08-28 NOTE — ASSESSMENT & PLAN NOTE
With unusual feeling of foreign body sensation in the mouth.  Likely part of hallucinations/delusions related to Alzheimer's with increasing disorganized thought processes.  Appears that when she does decide to eat that she is able to eat okay, and complaints are outside of the times she is actually eating.

## 2024-09-19 ENCOUNTER — OFFICE VISIT (OUTPATIENT)
Dept: FAMILY MEDICINE CLINIC | Facility: CLINIC | Age: 69
End: 2024-09-19

## 2024-09-19 VITALS
RESPIRATION RATE: 16 BRPM | SYSTOLIC BLOOD PRESSURE: 122 MMHG | DIASTOLIC BLOOD PRESSURE: 70 MMHG | HEART RATE: 60 BPM | TEMPERATURE: 98 F | HEIGHT: 55 IN | WEIGHT: 88.2 LBS | BODY MASS INDEX: 20.41 KG/M2 | OXYGEN SATURATION: 99 %

## 2024-09-19 DIAGNOSIS — F02.B2 MODERATE LATE ONSET ALZHEIMER'S DEMENTIA WITH PSYCHOTIC DISTURBANCE (HCC): Primary | ICD-10-CM

## 2024-09-19 DIAGNOSIS — Z23 ENCOUNTER FOR IMMUNIZATION: ICD-10-CM

## 2024-09-19 DIAGNOSIS — E44.1 MILD PROTEIN-CALORIE MALNUTRITION (HCC): ICD-10-CM

## 2024-09-19 DIAGNOSIS — F02.B11 MODERATE LATE ONSET ALZHEIMER'S DEMENTIA WITH AGITATION (HCC): Chronic | ICD-10-CM

## 2024-09-19 DIAGNOSIS — G30.1 MODERATE LATE ONSET ALZHEIMER'S DEMENTIA WITH PSYCHOTIC DISTURBANCE (HCC): Primary | ICD-10-CM

## 2024-09-19 DIAGNOSIS — G30.1 MODERATE LATE ONSET ALZHEIMER'S DEMENTIA WITH AGITATION (HCC): Chronic | ICD-10-CM

## 2024-09-19 DIAGNOSIS — R63.4 WEIGHT LOSS: ICD-10-CM

## 2024-09-19 PROCEDURE — 90662 IIV NO PRSV INCREASED AG IM: CPT

## 2024-09-19 PROCEDURE — 99214 OFFICE O/P EST MOD 30 MIN: CPT

## 2024-09-19 PROCEDURE — G0008 ADMIN INFLUENZA VIRUS VAC: HCPCS

## 2024-09-19 RX ORDER — QUETIAPINE FUMARATE 25 MG/1
25 TABLET, FILM COATED ORAL
Qty: 45 TABLET | Refills: 3 | Status: CANCELLED | OUTPATIENT
Start: 2024-09-19

## 2024-09-19 RX ORDER — LACTOSE-REDUCED FOOD
1 LIQUID (ML) ORAL 2 TIMES DAILY
Qty: 237 ML | Refills: 100 | Status: SHIPPED | OUTPATIENT
Start: 2024-09-19

## 2024-09-19 NOTE — ASSESSMENT & PLAN NOTE
Increase seroquel from 12.5 mg daily to 25 mg daily hs. Advised to take 50 mg if 25 mg is ineffective.   Declined home support such as a caregiver.

## 2024-09-19 NOTE — PROGRESS NOTES
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 Advised to take 50 mg if 25 mg is ineffective.   Declined home support such as a caregiver.        Weight loss  Encouraged high calorie/protein supplementation. Will attempt to send ensure again. She needs ensure due to protein-calorie malnutrition related to Dementia.        Encounter for immunization    Orders:    influenza vaccine, high-dose, PF 0.5 mL (Fluzone High Dose)    Mild protein-calorie malnutrition (HCC)    Orders:    Nutritional Supplements (Ensure Nutrition Shake) LIQD; Take 1 Bottle by mouth 2 (two) times a day    Moderate late onset Alzheimer's dementia with agitation (HCC)    Orders:    Nutritional Supplements (Ensure Nutrition Shake) LIQD; Take 1 Bottle by mouth 2 (two) times a day       History of Present Illness     Pt is here to follow up on Alzheimer's- sees geriatric medicine and was started on Seroquel 12.5 for psychosis r/t dementia, tactile hallucination (experiencing sensation of a presence of an object in oral cavity without anything present) and increased aggression. She is also not sleeping. She Has not experienced any improvement with initiation of Seroquel.      Weight loss- Complains daily that she is feeling something is stuck in her mouth and has associated decreased appetite. Reports feeling something is stuck even with liquid intake or when not eating. Has been losing weight, currently 88lbs.                  Review of Systems   Constitutional:  Negative for chills and fever.   HENT:  Negative for ear pain and sore throat.    Eyes:  Negative for pain and visual disturbance.   Respiratory:  Negative  "for cough and shortness of breath.    Cardiovascular:  Negative for chest pain and palpitations.   Gastrointestinal:  Negative for abdominal pain and vomiting.   Genitourinary:  Negative for dysuria and hematuria.   Musculoskeletal:  Negative for arthralgias and back pain.   Skin:  Negative for color change and rash.   Neurological:  Negative for seizures and syncope.   Psychiatric/Behavioral:  Positive for agitation, behavioral problems, hallucinations and sleep disturbance.    All other systems reviewed and are negative.          Objective     /70 (BP Location: Right arm, Patient Position: Sitting, Cuff Size: Standard)   Pulse 60   Temp 98 °F (36.7 °C) (Temporal)   Resp 16   Ht 4' 7\" (1.397 m)   Wt 40 kg (88 lb 3.2 oz)   SpO2 99%   BMI 20.50 kg/m²     Physical Exam  Vitals and nursing note reviewed.   Constitutional:       General: She is not in acute distress.     Appearance: Normal appearance. She is well-developed.   HENT:      Head: Normocephalic and atraumatic.      Right Ear: External ear normal.      Left Ear: External ear normal.      Nose: Nose normal.      Mouth/Throat:      Mouth: Mucous membranes are moist.      Dentition: Abnormal dentition.      Pharynx: Oropharynx is clear.      Comments: Dentition absent  Eyes:      Conjunctiva/sclera: Conjunctivae normal.   Cardiovascular:      Rate and Rhythm: Normal rate and regular rhythm.      Pulses: Normal pulses.      Heart sounds: Normal heart sounds. No murmur heard.  Pulmonary:      Effort: Pulmonary effort is normal. No respiratory distress.      Breath sounds: Normal breath sounds.   Abdominal:      Palpations: Abdomen is soft.      Tenderness: There is no abdominal tenderness.   Musculoskeletal:         General: No swelling. Normal range of motion.      Cervical back: Normal range of motion and neck supple.   Skin:     General: Skin is warm and dry.      Capillary Refill: Capillary refill takes less than 2 seconds.   Neurological:      " General: No focal deficit present.      Mental Status: She is alert and oriented to person, place, and time. Mental status is at baseline.   Psychiatric:         Mood and Affect: Mood normal.         Behavior: Behavior normal.         Thought Content: Thought content normal.         Judgment: Judgment normal.

## 2024-09-19 NOTE — ASSESSMENT & PLAN NOTE
Orders:    Nutritional Supplements (Ensure Nutrition Shake) LIQD; Take 1 Bottle by mouth 2 (two) times a day

## 2024-09-19 NOTE — ASSESSMENT & PLAN NOTE
Encouraged high calorie/protein supplementation. Will attempt to send ensure again. She needs ensure due to protein-calorie malnutrition related to Dementia.

## 2024-10-03 ENCOUNTER — OFFICE VISIT (OUTPATIENT)
Dept: FAMILY MEDICINE CLINIC | Facility: CLINIC | Age: 69
End: 2024-10-03

## 2024-10-03 ENCOUNTER — TELEPHONE (OUTPATIENT)
Dept: FAMILY MEDICINE CLINIC | Facility: CLINIC | Age: 69
End: 2024-10-03

## 2024-10-03 VITALS
OXYGEN SATURATION: 99 % | DIASTOLIC BLOOD PRESSURE: 80 MMHG | TEMPERATURE: 98 F | HEIGHT: 55 IN | HEART RATE: 67 BPM | SYSTOLIC BLOOD PRESSURE: 122 MMHG | RESPIRATION RATE: 16 BRPM | BODY MASS INDEX: 20.37 KG/M2 | WEIGHT: 88 LBS

## 2024-10-03 DIAGNOSIS — F02.B2 MODERATE LATE ONSET ALZHEIMER'S DEMENTIA WITH PSYCHOTIC DISTURBANCE (HCC): Primary | ICD-10-CM

## 2024-10-03 DIAGNOSIS — G30.1 MODERATE LATE ONSET ALZHEIMER'S DEMENTIA WITH PSYCHOTIC DISTURBANCE (HCC): Primary | ICD-10-CM

## 2024-10-03 DIAGNOSIS — Z23 ENCOUNTER FOR IMMUNIZATION: ICD-10-CM

## 2024-10-03 PROCEDURE — 90750 HZV VACC RECOMBINANT IM: CPT

## 2024-10-03 PROCEDURE — 99213 OFFICE O/P EST LOW 20 MIN: CPT

## 2024-10-03 PROCEDURE — 90471 IMMUNIZATION ADMIN: CPT

## 2024-10-03 RX ORDER — QUETIAPINE FUMARATE 25 MG/1
50 TABLET, FILM COATED ORAL
Qty: 45 TABLET | Refills: 3 | Status: SHIPPED | OUTPATIENT
Start: 2024-10-03

## 2024-10-03 NOTE — PROGRESS NOTES
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 If ineffective, increase to 75 mg daily. F/u 2 weeks.  Orders:    QUEtiapine (SEROquel) 25 mg tablet; Take 2 tablets (50 mg total) by mouth daily at bedtime    Encounter for immunization    Orders:    Zoster Vaccine Recombinant IM       History of Present Illness     Liseth Guzman is a 69 y.o. female  has a past medical history of Moderate late onset Alzheimer's dementia with agitation (HCC), No known health problems, Osteopenia of multiple sites, Primary hypertension, and Slow transit constipation.  has a past surgical history that includes Appendectomy ().    She presents today for a follow-up to assess Seroquel responsiveness to dementia associated psychosis. Son was advised tried giving her up to 37.5 mg per day without effectiveness. She continues to have the sensation of the presence of an object in her mouth.           Review of Systems   Constitutional:  Negative for chills and fever.   HENT:  Negative for ear pain and sore throat.    Eyes:  Negative for pain and visual disturbance.   Respiratory:  Negative for cough and shortness of breath.    Cardiovascular:  Negative for chest pain and palpitations.   Gastrointestinal:  Negative for abdominal pain and vomiting.   Genitourinary:  Negative for dysuria and hematuria.   Musculoskeletal:  Negative for arthralgias and back pain.   Skin:  Negative for color change and rash.   Neurological:  Negative for seizures and syncope.   Psychiatric/Behavioral:  Positive for behavioral problems and hallucinations.    All other systems reviewed and are negative.          Objective     /80 (BP Location:  "Right arm, Patient Position: Sitting, Cuff Size: Standard)   Pulse 67   Temp 98 °F (36.7 °C) (Temporal)   Resp 16   Ht 4' 7\" (1.397 m)   Wt 39.9 kg (88 lb)   SpO2 99%   BMI 20.45 kg/m²     Physical Exam  Vitals and nursing note reviewed.   Constitutional:       General: She is not in acute distress.     Appearance: Normal appearance. She is well-developed.   HENT:      Head: Normocephalic and atraumatic.      Right Ear: External ear normal.      Left Ear: External ear normal.      Nose: Nose normal.   Eyes:      Conjunctiva/sclera: Conjunctivae normal.   Cardiovascular:      Rate and Rhythm: Normal rate.   Pulmonary:      Effort: Pulmonary effort is normal.   Musculoskeletal:         General: No swelling. Normal range of motion.      Cervical back: Normal range of motion and neck supple.   Skin:     General: Skin is warm and dry.      Capillary Refill: Capillary refill takes less than 2 seconds.   Neurological:      General: No focal deficit present.      Mental Status: She is alert and oriented to person, place, and time. Mental status is at baseline.   Psychiatric:         Mood and Affect: Mood normal.         Behavior: Behavior normal.         Thought Content: Thought content normal.         Judgment: Judgment normal.         "

## 2024-10-03 NOTE — TELEPHONE ENCOUNTER
Please send medication to updated pharmacy on file. [facility]

## 2024-10-03 NOTE — ASSESSMENT & PLAN NOTE
Start seroquel 50 mg daily. If ineffective, increase to 75 mg daily. F/u 2 weeks.  Orders:    QUEtiapine (SEROquel) 25 mg tablet; Take 2 tablets (50 mg total) by mouth daily at bedtime

## 2024-10-03 NOTE — TELEPHONE ENCOUNTER
Pt's son states they forgot to ask provider for [facility] medical neccessity for a food supplementation.   They mentioned milk supplement. States it needs to specifically state the conditions that are leading to the weightloss and why the supplement would be beneficial.     Please Advise,   Thank you!    Ivermectin Pregnancy And Lactation Text: This medication is Pregnancy Category C and it isn't known if it is safe during pregnancy. It is also excreted in breast milk.

## 2024-10-22 ENCOUNTER — TELEMEDICINE (OUTPATIENT)
Dept: FAMILY MEDICINE CLINIC | Facility: CLINIC | Age: 69
End: 2024-10-22

## 2024-10-22 DIAGNOSIS — F02.B2 MODERATE LATE ONSET ALZHEIMER'S DEMENTIA WITH PSYCHOTIC DISTURBANCE (HCC): ICD-10-CM

## 2024-10-22 DIAGNOSIS — K08.9 CHRONIC DENTAL PAIN: Primary | ICD-10-CM

## 2024-10-22 DIAGNOSIS — G30.1 MODERATE LATE ONSET ALZHEIMER'S DEMENTIA WITH PSYCHOTIC DISTURBANCE (HCC): ICD-10-CM

## 2024-10-22 DIAGNOSIS — G89.29 CHRONIC DENTAL PAIN: Primary | ICD-10-CM

## 2024-10-22 PROCEDURE — G2211 COMPLEX E/M VISIT ADD ON: HCPCS

## 2024-10-22 PROCEDURE — 99213 OFFICE O/P EST LOW 20 MIN: CPT

## 2024-10-22 RX ORDER — QUETIAPINE FUMARATE 50 MG/1
50 TABLET, FILM COATED ORAL
Qty: 90 TABLET | Refills: 1 | Status: SHIPPED | OUTPATIENT
Start: 2024-10-22 | End: 2024-10-22

## 2024-10-22 RX ORDER — QUETIAPINE FUMARATE 25 MG/1
25 TABLET, FILM COATED ORAL
Qty: 90 TABLET | Refills: 1 | Status: SHIPPED | OUTPATIENT
Start: 2024-10-22

## 2024-10-22 RX ORDER — QUETIAPINE FUMARATE 50 MG/1
50 TABLET, FILM COATED ORAL
Qty: 90 TABLET | Refills: 1 | Status: SHIPPED | OUTPATIENT
Start: 2024-10-22

## 2024-10-22 RX ORDER — QUETIAPINE FUMARATE 25 MG/1
50 TABLET, FILM COATED ORAL
Qty: 90 TABLET | Refills: 1 | Status: SHIPPED | OUTPATIENT
Start: 2024-10-22 | End: 2024-10-22

## 2024-10-22 NOTE — PROGRESS NOTES
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  Using orajel prn may help alleviate the patient's anxiety and discomfort.   Orders:    QUEtiapine (SEROquel) 50 mg tablet; Take 1 tablet (50 mg total) by mouth daily at bedtime    QUEtiapine (SEROquel) 25 mg tablet; Take 1 tablet (25 mg total) by mouth daily at bedtime    Chronic dental pain    Orders:    benzocaine (ORAJEL) 10 % mucosal gel; Apply 1 Application to the mouth or throat as needed for mucositis         Encounter provider BERNARD Ferro    The patient was identified by name and date of birth. Liseth Guzman was informed that this is a telemedicine visit and that the visit is being conducted through the [employer] Teams platform. She agrees to proceed..  My office door was closed. No one else was in the room.  She acknowledged consent and understanding of privacy and security of the video platform. The patient has agreed to participate and understands they can discontinue the visit at any time.    Patient is aware this is a billable service.     History of Present Illness         This visit was scheduled to discuss seroquel response. Son reports that agitation has improved. She used to consistently believe that there was something in her mouth. However, now this only occurs occasional and it is temporary.  He also reports that she is not as aggressive. She is taking seroquel 75 mg daily.           Review of Systems  As per HPI        Objective     There were no vitals taken for this visit.  Physical  Exam  Neurological:      General: No focal deficit present.      Mental Status: She is alert. Mental status is at baseline.   Psychiatric:         Mood and Affect: Mood normal.         Visit Time  Total Visit Duration: 14

## 2024-10-22 NOTE — ASSESSMENT & PLAN NOTE
Continue seroquel 75 mg daily.  Using orajel prn may help alleviate the patient's anxiety and discomfort.   Orders:    QUEtiapine (SEROquel) 50 mg tablet; Take 1 tablet (50 mg total) by mouth daily at bedtime    QUEtiapine (SEROquel) 25 mg tablet; Take 1 tablet (25 mg total) by mouth daily at bedtime

## 2024-11-22 ENCOUNTER — HOSPITAL ENCOUNTER (OUTPATIENT)
Dept: MAMMOGRAPHY | Facility: CLINIC | Age: 69
End: 2024-11-22
Payer: COMMERCIAL

## 2024-11-22 VITALS — BODY MASS INDEX: 20.37 KG/M2 | WEIGHT: 88 LBS | HEIGHT: 55 IN

## 2024-11-22 DIAGNOSIS — Z12.31 ENCOUNTER FOR SCREENING MAMMOGRAM FOR MALIGNANT NEOPLASM OF BREAST: ICD-10-CM

## 2024-11-22 PROCEDURE — 77067 SCR MAMMO BI INCL CAD: CPT

## 2024-11-22 PROCEDURE — 77063 BREAST TOMOSYNTHESIS BI: CPT

## 2024-12-18 ENCOUNTER — APPOINTMENT (OUTPATIENT)
Dept: LAB | Facility: HOSPITAL | Age: 69
End: 2024-12-18
Payer: COMMERCIAL

## 2024-12-18 ENCOUNTER — OFFICE VISIT (OUTPATIENT)
Dept: FAMILY MEDICINE CLINIC | Facility: CLINIC | Age: 69
End: 2024-12-18

## 2024-12-18 VITALS
OXYGEN SATURATION: 99 % | DIASTOLIC BLOOD PRESSURE: 70 MMHG | HEIGHT: 55 IN | SYSTOLIC BLOOD PRESSURE: 130 MMHG | BODY MASS INDEX: 19.9 KG/M2 | TEMPERATURE: 98 F | RESPIRATION RATE: 20 BRPM | HEART RATE: 66 BPM | WEIGHT: 86 LBS

## 2024-12-18 DIAGNOSIS — F02.B2 MODERATE LATE ONSET ALZHEIMER'S DEMENTIA WITH PSYCHOTIC DISTURBANCE (HCC): ICD-10-CM

## 2024-12-18 DIAGNOSIS — G30.1 MODERATE LATE ONSET ALZHEIMER'S DEMENTIA WITH PSYCHOTIC DISTURBANCE (HCC): ICD-10-CM

## 2024-12-18 DIAGNOSIS — F02.B2 MODERATE LATE ONSET ALZHEIMER'S DEMENTIA WITH PSYCHOTIC DISTURBANCE (HCC): Primary | ICD-10-CM

## 2024-12-18 DIAGNOSIS — G30.1 MODERATE LATE ONSET ALZHEIMER'S DEMENTIA WITH PSYCHOTIC DISTURBANCE (HCC): Primary | ICD-10-CM

## 2024-12-18 PROBLEM — I10 PRIMARY HYPERTENSION: Status: RESOLVED | Noted: 2022-11-30 | Resolved: 2024-12-18

## 2024-12-18 PROCEDURE — 99214 OFFICE O/P EST MOD 30 MIN: CPT

## 2024-12-18 PROCEDURE — G2211 COMPLEX E/M VISIT ADD ON: HCPCS

## 2024-12-18 RX ORDER — QUETIAPINE FUMARATE 100 MG/1
100 TABLET, FILM COATED ORAL
Qty: 90 TABLET | Refills: 1 | Status: SHIPPED | OUTPATIENT
Start: 2024-12-18

## 2024-12-18 NOTE — ASSESSMENT & PLAN NOTE
Increase Seroquel to 100 mg. Check EKG due to Seroquel's QTc prolongation effect. Referred to social work. Agitation could possibly be due to boredom. Patient would benefit from adult day care program.   Orders:    ECG 12 lead; Future    QUEtiapine (SEROquel) 100 mg tablet; Take 1 tablet (100 mg total) by mouth daily at bedtime    Ambulatory Referral to Social Work Care Management Program; Future

## 2024-12-18 NOTE — PROGRESS NOTES
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 Check EKG due to Seroquel's QTc prolongation effect. Referred to social work. Agitation could possibly be due to boredom. Patient would benefit from adult day care program.   Orders:    ECG 12 lead; Future    QUEtiapine (SEROquel) 100 mg tablet; Take 1 tablet (100 mg total) by mouth daily at bedtime    Ambulatory Referral to Social Work Care Management Program; Future          Depression Screening and Follow-up Plan: Patient was screened for depression during today's encounter. They screened negative with a PHQ-2 score of 0.      History of Present Illness     Liseth Guzman is a 69 y.o. female  has a past medical history of Moderate late onset Alzheimer's dementia with agitation (HCC), No known health problems, Osteopenia of multiple sites, Primary hypertension, and Slow transit constipation.  has a past surgical history that includes Appendectomy ().    Here today for Moderate late onset Alzheimer's dementia with psychotic disturbance. Son reports compliance with Seroquel  75 mg daily without effectiveness. Reports mom continues to have episodes of significant agitation. The patient, son, and daughter in law all deny constipation, pain, acute life changes. They report that agitation usually occurs at home without an identifiable trigger.      Review of Systems  As per HPI      Objective   /70 (BP Location: Right arm, Patient Position: Sitting, Cuff Size: Standard)   Pulse 66   Temp 98 °F (36.7 °C) (Temporal)   Resp 20   Ht 4' 7\" (1.397 m)   Wt 39 kg (86 lb)   SpO2 99%   BMI 19.99 kg/m²      Physical Exam  Vitals and nursing note reviewed. "   Constitutional:       General: She is not in acute distress.     Appearance: Normal appearance. She is well-developed.   HENT:      Head: Normocephalic and atraumatic.      Right Ear: External ear normal.      Left Ear: External ear normal.      Nose: Nose normal.   Eyes:      Conjunctiva/sclera: Conjunctivae normal.   Cardiovascular:      Rate and Rhythm: Normal rate and regular rhythm.      Pulses: Normal pulses.      Heart sounds: Normal heart sounds. No murmur heard.  Pulmonary:      Effort: Pulmonary effort is normal. No respiratory distress.      Breath sounds: Normal breath sounds.   Abdominal:      Palpations: Abdomen is soft.      Tenderness: There is no abdominal tenderness.   Musculoskeletal:         General: Normal range of motion.      Cervical back: Normal range of motion and neck supple.   Skin:     General: Skin is warm and dry.   Neurological:      Mental Status: She is alert and oriented to person, place, and time. Mental status is at baseline.   Psychiatric:         Mood and Affect: Mood normal.         Behavior: Behavior normal.         Thought Content: Thought content normal.         Judgment: Judgment normal.

## 2024-12-19 LAB
ATRIAL RATE: 82 BPM
P AXIS: 116 DEGREES
PR INTERVAL: 128 MS
QRS AXIS: 202 DEGREES
QRSD INTERVAL: 64 MS
QT INTERVAL: 386 MS
QTC INTERVAL: 451 MS
T WAVE AXIS: 136 DEGREES
VENTRICULAR RATE: 82 BPM

## 2024-12-19 PROCEDURE — 93010 ELECTROCARDIOGRAM REPORT: CPT | Performed by: STUDENT IN AN ORGANIZED HEALTH CARE EDUCATION/TRAINING PROGRAM

## 2024-12-20 ENCOUNTER — PATIENT OUTREACH (OUTPATIENT)
Dept: FAMILY MEDICINE CLINIC | Facility: CLINIC | Age: 69
End: 2024-12-20

## 2024-12-20 NOTE — PROGRESS NOTES
NOEL VILLALOBOS had received a referral from BERNARD Ferro r/t Alzheimer's disease. NOEL VILLALOBOS had completed a chart review. Per chart, patient would benefit from an adult day care program. Per chart, patient has approved waiver services. Per chart, patient has a [facility] , Lauryn Stone [phone]. NOEL VILLALOBOS notes Lauryn would be able to set up adult day care program for patient. Per chart, patient's son, Cirilo paid caregiver through [employer] [facility] [employer].    NOEL VILLALOBOS had called Cirilo via phone. OP SW spoke in the preferred language, [place]. OP GRISELDA had introduced herself. OP GRISELDA reviewed reason for consult. OP GRISELDA reviewed her role. Cirilo understood.    Cirilo reported patient lives with him, his partner, Jacquie and other family members. Cirilo reported he is main support for patient. Cirilo reported patient has SSI for income. Cirilo denied any food, housing or utility issues at this time. Cirilo denied any transportation issues at this time.    Cirilo reported he is still paid caregiver. Cirilo reported patient was approved 8 hours a day. NOEL VILLALOBOS informed Cirilo that he needs to call Lauryn regarding adult day care program. Cirilo stated he would give Lauryn a call. NOEL VILLALOBOS provided Lauryn's contact number.    Per chart, there are no h/o MH. Per chart, there are no h/o TANGELA. Cirilo thanked NOEL VILLALOBOS for information. Cirilo denied any other needs at this time.     NOEL VILLALOBOS had provided her contact information. OP GRISELDA advised Cirilo reach out as needed. Cirilo agreed. Cirilo denied any continued SW outreach at this time. OP GRISELDA closed referral. Please reconsult as needed.

## 2025-01-02 ENCOUNTER — TELEMEDICINE (OUTPATIENT)
Dept: FAMILY MEDICINE CLINIC | Facility: CLINIC | Age: 70
End: 2025-01-02

## 2025-01-02 DIAGNOSIS — F02.B2 MODERATE LATE ONSET ALZHEIMER'S DEMENTIA WITH PSYCHOTIC DISTURBANCE (HCC): Primary | ICD-10-CM

## 2025-01-02 DIAGNOSIS — G30.1 MODERATE LATE ONSET ALZHEIMER'S DEMENTIA WITH PSYCHOTIC DISTURBANCE (HCC): Primary | ICD-10-CM

## 2025-01-02 PROCEDURE — G2211 COMPLEX E/M VISIT ADD ON: HCPCS

## 2025-01-02 PROCEDURE — 99213 OFFICE O/P EST LOW 20 MIN: CPT

## 2025-01-02 RX ORDER — QUETIAPINE FUMARATE 50 MG/1
50 TABLET, FILM COATED ORAL EVERY MORNING
Qty: 30 TABLET | Refills: 0 | Status: SHIPPED | OUTPATIENT
Start: 2025-01-02

## 2025-01-02 NOTE — PROGRESS NOTES
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 F/u 2 weeks  Orders:    QUEtiapine (SEROquel) 50 mg tablet; Take 1 tablet (50 mg total) by mouth every morning        Encounter provider BERNARD Ferro    The patient was identified by name and date of birth. Liseth Richard was informed that this is a telemedicine visit and that the visit is being conducted through Telephone.  My office door was closed. No one else was in the room.  She acknowledged consent and understanding of privacy and security of the video platform. The patient has agreed to participate and understands they can discontinue the visit at any time.    Patient is aware this is a billable service.     History of Present Illness     Liseth Richard is a 69 y.o. female  has a past medical history of Moderate late onset Alzheimer's dementia with agitation (HCC), No known health problems, Osteopenia of multiple sites, Primary hypertension, and Slow transit constipation.  has a past surgical history that includes Appendectomy ().    This visit was scheduled for a follow-up on agitation/aggressiveness 2/2 dementia. Visit was spent speaking with son & wife. They report that seroquel 100 mg daily has been ineffective.       Review of Systems   Constitutional:  Negative for chills and fever.   HENT:  Negative for ear pain and sore throat.    Eyes:  Negative for pain and visual disturbance.   Respiratory:  Negative for cough and shortness of breath.     Cardiovascular:  Negative for chest pain and palpitations.   Gastrointestinal:  Negative for abdominal pain and vomiting.   Genitourinary:  Negative for dysuria and hematuria.   Musculoskeletal:  Negative for arthralgias and back pain.   Skin:  Negative for color change and rash.   Neurological:  Negative for seizures and syncope.   Psychiatric/Behavioral:  Positive for behavioral problems.    All other systems reviewed and are negative.      Objective   There were no vitals taken for this visit.    Physical Exam  Neurological:      Mental Status: She is alert.   Psychiatric:         Mood and Affect: Mood normal.         Behavior: Behavior normal.         Thought Content: Thought content normal.         Judgment: Judgment normal.         Visit Time  Total Visit Duration: 10

## 2025-01-02 NOTE — ASSESSMENT & PLAN NOTE
Continue seroquel 100 mg hs & start seroquel 50 mg in the morning. F/u 2 weeks  Orders:    QUEtiapine (SEROquel) 50 mg tablet; Take 1 tablet (50 mg total) by mouth every morning

## 2025-01-20 ENCOUNTER — TELEMEDICINE (OUTPATIENT)
Dept: FAMILY MEDICINE CLINIC | Facility: CLINIC | Age: 70
End: 2025-01-20

## 2025-01-20 DIAGNOSIS — G30.1 MODERATE LATE ONSET ALZHEIMER'S DEMENTIA WITH PSYCHOTIC DISTURBANCE (HCC): Primary | ICD-10-CM

## 2025-01-20 DIAGNOSIS — F02.B2 MODERATE LATE ONSET ALZHEIMER'S DEMENTIA WITH PSYCHOTIC DISTURBANCE (HCC): Primary | ICD-10-CM

## 2025-01-20 PROCEDURE — 99213 OFFICE O/P EST LOW 20 MIN: CPT

## 2025-01-20 PROCEDURE — G2211 COMPLEX E/M VISIT ADD ON: HCPCS

## 2025-01-20 NOTE — ASSESSMENT & PLAN NOTE
Will slowly discontinue seroquel & start Rexulti.   Week 1: Decrease seroquel from 150 mg to 100 mg hs. Start Rexulti 1 mg daily  Week 2: Decrease seroquel to 50 mg. Increase Rexulti to 2 mg daily if no improvement.  Week 3: Discontinue seroquel. Titrate Rexulti based on response & tolerability.  Orders:    Brexpiprazole (REXULTI) 1 MG tablet; Take 1 tablet (1 mg total) by mouth daily

## 2025-01-20 NOTE — PROGRESS NOTES
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   Week 1: Decrease seroquel from 150 mg to 100 mg hs. Start Rexulti 1 mg daily  Week 2: Decrease seroquel to 50 mg. Increase Rexulti to 2 mg daily if no improvement.  Week 3: Discontinue seroquel. Titrate Rexulti based on response & tolerability.  Orders:    Brexpiprazole (REXULTI) 1 MG tablet; Take 1 tablet (1 mg total) by mouth daily        Encounter provider BERNARD Ferro    The patient was identified by name and date of birth. Liseth Richard was informed that this is a telemedicine visit and that the visit is being conducted through Telephone.  My office door was closed. No one else was in the room.  She acknowledged consent and understanding of privacy and security of the video platform. The patient has agreed to participate and understands they can discontinue the visit at any time.    Patient is aware this is a billable service.     History of Present Illness     Liseth Richard is a 69 y.o. female  has a past medical history of Moderate late onset Alzheimer's dementia with agitation (HCC), No known health problems, Osteopenia of multiple sites, Primary hypertension, and Slow transit constipation.  has a past surgical history that includes Appendectomy ().    Visit was scheduled for agitation and psychosis related to alzheimer's. Son reports that she has had sudden outbursts of complaints where she becomes severely agitated and begins to endorse things that are not  "necessarily true. She will say things like \"I am constipated\" even though she is not experiencing constipation. She will say \"I cannot breathe\" even though she is breathing okay. She will scream and \"my stomach hurts\" but then she will be find shortly thereafter. Seroquel 150 mg daily has been ineffective.       Review of Systems  As per HPI    Objective   There were no vitals taken for this visit.    Physical Exam  Neurological:      Mental Status: She is alert.   Psychiatric:         Mood and Affect: Mood normal.         Behavior: Behavior normal.         Thought Content: Thought content normal.         Judgment: Judgment normal.         Visit Time  Total Visit Duration: 6  "

## 2025-01-30 ENCOUNTER — TELEMEDICINE (OUTPATIENT)
Dept: FAMILY MEDICINE CLINIC | Facility: CLINIC | Age: 70
End: 2025-01-30

## 2025-01-30 DIAGNOSIS — G30.1 MODERATE LATE ONSET ALZHEIMER'S DEMENTIA WITH PSYCHOTIC DISTURBANCE (HCC): Primary | ICD-10-CM

## 2025-01-30 DIAGNOSIS — F02.B2 MODERATE LATE ONSET ALZHEIMER'S DEMENTIA WITH PSYCHOTIC DISTURBANCE (HCC): Primary | ICD-10-CM

## 2025-01-30 DIAGNOSIS — M85.89 OSTEOPENIA OF MULTIPLE SITES: ICD-10-CM

## 2025-01-30 PROCEDURE — 99213 OFFICE O/P EST LOW 20 MIN: CPT

## 2025-01-30 PROCEDURE — G2211 COMPLEX E/M VISIT ADD ON: HCPCS

## 2025-01-30 RX ORDER — QUETIAPINE FUMARATE 100 MG/1
50 TABLET, FILM COATED ORAL
Start: 2025-01-30

## 2025-01-30 NOTE — ASSESSMENT & PLAN NOTE
Orders:    Calcium Carb-Cholecalciferol (calcium carbonate-vitamin D) 500 mg-5 mcg tablet; Take 1 tablet by mouth 2 (two) times a day with meals

## 2025-01-30 NOTE — PROGRESS NOTES
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 Then, discontinue seroquel, and continue with rexulti 2 mg daily. Reassess in two weeks.  Orders:    Brexpiprazole (Rexulti) 2 MG tablet; Take 1 tablet (2 mg total) by mouth daily    QUEtiapine (SEROquel) 100 mg tablet; Take 0.5 tablets (50 mg total) by mouth daily at bedtime    Osteopenia of multiple sites    Orders:    Calcium Carb-Cholecalciferol (calcium carbonate-vitamin D) 500 mg-5 mcg tablet; Take 1 tablet by mouth 2 (two) times a day with meals        Encounter provider BERNARD Ferro    The patient was identified by name and date of birth. Liseth Richard was informed that this is a telemedicine visit and that the visit is being conducted through Telephone.  My office door was closed. No one else was in the room.  She acknowledged consent and understanding of privacy and security of the video platform. The patient has agreed to participate and understands they can discontinue the visit at any time.    Patient is aware this is a billable service.     History of Present Illness     Liseth Richard is a 69 y.o. female  has a past medical history of Moderate late onset Alzheimer's dementia with agitation (HCC), No known health problems, Osteopenia of multiple sites, Primary hypertension, and Slow transit constipation.  has a past surgical history that includes Appendectomy ().    Visit was scheduled for agitation and psychosis related to alzheimer's.  She  continues with severe agitation and psychosis. The visit was spent talking to the patient's son. He reports that he has been giving her rexulti 1 mg daily & seroquel 100 mg without any effectiveness.      Review of Systems  As per HPI    Objective   There were no vitals taken for this visit.    Physical Exam  Neurological:      General: No focal deficit present.      Mental Status: She is alert and oriented to person, place, and time. Mental status is at baseline.   Psychiatric:         Mood and Affect: Mood normal.         Behavior: Behavior normal.         Thought Content: Thought content normal.         Judgment: Judgment normal.         Visit Time  Total Visit Duration: 7

## 2025-01-30 NOTE — ASSESSMENT & PLAN NOTE
Start Rexulti 2 mg daily, decrease seroquel to 50 mg daily x 1 week. Then, discontinue seroquel, and continue with rexulti 2 mg daily. Reassess in two weeks.  Orders:    Brexpiprazole (Rexulti) 2 MG tablet; Take 1 tablet (2 mg total) by mouth daily    QUEtiapine (SEROquel) 100 mg tablet; Take 0.5 tablets (50 mg total) by mouth daily at bedtime

## 2025-02-13 ENCOUNTER — TELEMEDICINE (OUTPATIENT)
Dept: FAMILY MEDICINE CLINIC | Facility: CLINIC | Age: 70
End: 2025-02-13

## 2025-02-13 DIAGNOSIS — F02.B2 MODERATE LATE ONSET ALZHEIMER'S DEMENTIA WITH PSYCHOTIC DISTURBANCE (HCC): Primary | ICD-10-CM

## 2025-02-13 DIAGNOSIS — G30.1 MODERATE LATE ONSET ALZHEIMER'S DEMENTIA WITH PSYCHOTIC DISTURBANCE (HCC): Primary | ICD-10-CM

## 2025-02-13 PROCEDURE — 98008 SYNCH AUDIO-ONLY NEW SF 15: CPT

## 2025-02-13 NOTE — PROGRESS NOTES
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 Restart seroquel 100 mg hs. F/u next week.           Encounter provider BERNARD Ferro    The patient was identified by name and date of birth. Liseth Richard was informed that this is a telemedicine visit and that the visit is being conducted through Telephone.  My office door was closed. No one else was in the room.  She acknowledged consent and understanding of privacy and security of the video platform. The patient has agreed to participate and understands they can discontinue the visit at any time.    Patient is aware this is a billable service.     History of Present Illness     Liseth Richard is a 69 y.o. female  has a past medical history of Moderate late onset Alzheimer's dementia with agitation (HCC), No known health problems, Osteopenia of multiple sites, Primary hypertension, and Slow transit constipation.  has a past surgical history that includes Appendectomy ().    Visit scheduled today to f/u on agitation/psychosis secondary to dementia. Last OV, rexulti was increased to 2 mg daily and seroquel was discontinued. Son reports that he feels as though Liseth is worsening. He feels as though seroquel was helping more. He gave her seroquel 75 mg the other night and that seemed to be effective. He prefers to go back to seroquel instead of increasing Rexulti.       Review of Systems  As per HPI  Objective   There were no vitals taken for this visit.    Physical Exam  Neurological:      Mental Status: She is  alert.   Psychiatric:         Attention and Perception: She is inattentive.         Mood and Affect: Mood is elated.         Speech: Speech normal.         Behavior: Behavior is uncooperative, agitated and combative.         Cognition and Memory: Cognition is impaired. Memory is impaired.         Judgment: Judgment is impulsive and inappropriate.         Visit Time  Total Visit Duration: 18

## 2025-02-19 DIAGNOSIS — K59.01 SLOW TRANSIT CONSTIPATION: ICD-10-CM

## 2025-02-19 RX ORDER — SENNOSIDES 8.6 MG
8.6 TABLET ORAL DAILY
Qty: 90 TABLET | Refills: 3 | Status: SHIPPED | OUTPATIENT
Start: 2025-02-19

## 2025-02-19 NOTE — TELEPHONE ENCOUNTER
Patient called office today requesting medication refill:  senna (SENOKOT) 8.6 mg     Please advise. Thank you!

## 2025-02-24 ENCOUNTER — TELEMEDICINE (OUTPATIENT)
Dept: FAMILY MEDICINE CLINIC | Facility: CLINIC | Age: 70
End: 2025-02-24

## 2025-02-24 DIAGNOSIS — G30.1 MODERATE LATE ONSET ALZHEIMER'S DEMENTIA WITH PSYCHOTIC DISTURBANCE (HCC): Primary | ICD-10-CM

## 2025-02-24 DIAGNOSIS — F02.B2 MODERATE LATE ONSET ALZHEIMER'S DEMENTIA WITH PSYCHOTIC DISTURBANCE (HCC): Primary | ICD-10-CM

## 2025-02-24 PROCEDURE — 99213 OFFICE O/P EST LOW 20 MIN: CPT

## 2025-02-24 PROCEDURE — G2211 COMPLEX E/M VISIT ADD ON: HCPCS

## 2025-02-24 RX ORDER — QUETIAPINE FUMARATE 25 MG/1
25 TABLET, FILM COATED ORAL
Start: 2025-02-24

## 2025-02-24 RX ORDER — QUETIAPINE FUMARATE 100 MG/1
100 TABLET, FILM COATED ORAL
Qty: 90 TABLET | Refills: 0
Start: 2025-02-24

## 2025-02-24 RX ORDER — QUETIAPINE FUMARATE 25 MG/1
25 TABLET, FILM COATED ORAL DAILY
Qty: 60 TABLET | Refills: 0 | Status: SHIPPED | OUTPATIENT
Start: 2025-02-24

## 2025-02-24 NOTE — ASSESSMENT & PLAN NOTE
Continue seroquel 125 mg hs. Start seroquel 25 mg in the morning. Follow-up with geriatric medicine as scheduled in two weeks.   Orders:    QUEtiapine (SEROquel) 25 mg tablet; Take 1 tablet (25 mg total) by mouth in the morning    QUEtiapine (SEROquel) 100 mg tablet; Take 1 tablet (100 mg total) by mouth daily at bedtime    QUEtiapine (SEROquel) 25 mg tablet; Take 1 tablet (25 mg total) by mouth daily at bedtime    Ambulatory Referral to Social Work Care Management Program; Future

## 2025-02-24 NOTE — PROGRESS NOTES
Virtual Regular VisitName: Liseth Richard      : 1955      MRN: [id number]  Encounter Provider: BERNARD Ferro  Encounter Date: 2025   Encounter department: 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 Start seroquel 25 mg in the morning. Follow-up with geriatric medicine as scheduled in two weeks.   Orders:    QUEtiapine (SEROquel) 25 mg tablet; Take 1 tablet (25 mg total) by mouth in the morning    QUEtiapine (SEROquel) 100 mg tablet; Take 1 tablet (100 mg total) by mouth daily at bedtime    QUEtiapine (SEROquel) 25 mg tablet; Take 1 tablet (25 mg total) by mouth daily at bedtime    Ambulatory Referral to Social Work Care Management Program; Future        History of Present Illness     Liseth Richard is a 69 y.o. female  has a past medical history of Moderate late onset Alzheimer's dementia with agitation (HCC), No known health problems, Osteopenia of multiple sites, Primary hypertension, and Slow transit constipation.  has a past surgical history that includes Appendectomy ().    Visit was scheduled today to follow-up on agitation/psychosis secondary to dementia. Last OV, rexulti was discontinued due to ineffectiveness. Son reports  that mom has been taking seroquel 125 mg hs which has been somewhat effective. She typically becomes agitated when she first has to take medication but then medication helps her fall asleep so she is not as agitated. Prior to restarting seroquel, she was up all night screaming. However, she remains agitated during the day with the same discomfort of a sensation that there is something in her mouth that needs to be taken out but there is nothing in her mouth. However, sometimes she is agitated without this discomfort/sensation.       Review of Systems  As per HPI    Objective   There were no vitals taken for this  visit.    Physical Exam  Neurological:      General: No focal deficit present.      Mental Status: She is alert and oriented to person, place, and time. Mental status is at baseline.   Psychiatric:         Mood and Affect: Mood normal.         Behavior: Behavior normal.         Thought Content: Thought content normal.         Judgment: Judgment normal.         Administrative Statements   Encounter provider BERNARD Ferro    The Patient is located at Home and in the following state in which I hold an active license [place].    The patient was identified by name and date of birth. Liseth Richard was informed that this is a telemedicine visit and that the visit is being conducted through Telephone.  My office door was closed. No one else was in the room.  She acknowledged consent and understanding of privacy and security of the video platform. The patient has agreed to participate and understands they can discontinue the visit at any time.    I have spent a total time of 8 minutes in caring for this patient on the day of the visit/encounter including .

## 2025-03-11 ENCOUNTER — OFFICE VISIT (OUTPATIENT)
Age: 70
End: 2025-03-11
Payer: COMMERCIAL

## 2025-03-11 VITALS
DIASTOLIC BLOOD PRESSURE: 74 MMHG | HEART RATE: 68 BPM | OXYGEN SATURATION: 98 % | HEIGHT: 55 IN | BODY MASS INDEX: 19.35 KG/M2 | WEIGHT: 83.6 LBS | TEMPERATURE: 98.4 F | SYSTOLIC BLOOD PRESSURE: 128 MMHG

## 2025-03-11 DIAGNOSIS — F02.B2 MODERATE LATE ONSET ALZHEIMER'S DEMENTIA WITH PSYCHOTIC DISTURBANCE (HCC): Primary | ICD-10-CM

## 2025-03-11 DIAGNOSIS — G30.1 MODERATE LATE ONSET ALZHEIMER'S DEMENTIA WITH PSYCHOTIC DISTURBANCE (HCC): Primary | ICD-10-CM

## 2025-03-11 PROBLEM — Z91.81 AT MODERATE RISK FOR FALL: Status: RESOLVED | Noted: 2023-12-06 | Resolved: 2025-03-11

## 2025-03-11 PROCEDURE — 99483 ASSMT & CARE PLN PT COG IMP: CPT | Performed by: INTERNAL MEDICINE

## 2025-03-11 RX ORDER — QUETIAPINE FUMARATE 100 MG/1
100 TABLET, FILM COATED ORAL 2 TIMES DAILY
Qty: 180 TABLET | Refills: 3 | Status: SHIPPED | OUTPATIENT
Start: 2025-03-11

## 2025-03-11 NOTE — ASSESSMENT & PLAN NOTE
With ongoing severe behavioral symptoms.  Start SSRI therapy with sertraline 50 mg daily.  Does not tolerate donepezil.  Change quetiapine to 100 mg twice a day for more consistent dosing.  Reviewed previous Rexulti trial which did not seem to work.  Discussed options and recommend adult day services.  Likely may benefit from getting out of the house in a structured setting.    Staging: Moderate Stage Dementia (FAST stage 5)    Decision-making capacity: Does not have capacity for complex medical or financial decisions    Caregiver Review: With severe caregiver stress due to severe behavioral and neuropsychiatric symptoms of dementia, as well as children with disability at home.  Recommend starting adult day services.    Safety:  Medication Review: Without polypharmacy  Driving: Not driving.  Fall Risk: Medium fall risk  ACP Review: Without POA.  Consider guardianship.

## 2025-03-11 NOTE — PROGRESS NOTES
[facility]  [address] [place] [address], [address]  [place] [address]  [phone]    Social Work Follow-Up    MSW met with Liseth Richard, son, Cirilo, and daughter-in-law, Jacquie, for follow up visit. MSW spoke with family via [employer] .  During visit, MSW confirmed that patient does have Waiver Services and made aware that son is patient's paid caregiver.  At this time, family is interested in possible Adult Day programs.  MSW reviewed and provided the following resources:   -Adult Day Center list (in [place])  -[facility] brochure (in [place])    MSW will remain available as needed.

## 2025-03-11 NOTE — PROGRESS NOTES
ASSESSMENT AND PLAN:  1. 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  Start SSRI therapy with sertraline 50 mg daily.  Does not tolerate donepezil.  Change quetiapine to 100 mg twice a day for more consistent dosing.  Reviewed previous Rexulti trial which did not seem to work.  Discussed options and recommend adult day services.  Likely may benefit from getting out of the house in a structured setting.    Staging: Moderate Stage Dementia (FAST stage 5)    Decision-making capacity: Does not have capacity for complex medical or financial decisions    Caregiver Review: With severe caregiver stress due to severe behavioral and neuropsychiatric symptoms of dementia, as well as children with disability at home.  Recommend starting adult day services.    Safety:  Medication Review: Without polypharmacy  Driving: Not driving.  Fall Risk: Medium fall risk  ACP Review: Without POA.  Consider guardianship.  Orders:  -     QUEtiapine (SEROquel) 100 mg tablet; Take 1 tablet (100 mg total) by mouth 2 (two) times a day  -     sertraline (Zoloft) 50 mg tablet; Take 1 tablet (50 mg total) by mouth daily        HPI:    We had the pleasure of evaluating Liseth Richard who is a 69 y.o. female in Geriatric consultation today, along with son ARMAND Kerr to provide further history.    COGNITION UPDATE:  They report the medication is not working very well.  She continues to have difficulty sleeping, last night not sleeping at all.  They report she says that she feels that she is choking and then crying because she is 'choking', every day, all day long.    MOOD:  Changes in mood or personality: Yes  Seems anxious: Yes  Seems depressed: No  With suicidal ideation: No    FUNCTIONAL STATUS:  BADLs  Does patient require assistance with:  Bathing: Yes  Dressing: Yes  Transferring: No  Continence: No  Toileting: Yes  Feeding: No    IADLs  Does  patient require assistance with:  Telephone: Yes  Shopping: Yes  Food Preparation: Yes  Housekeeping: Yes  Laundry: Yes  Transportation: Yes  Medications: Yes  Finances: Yes    NEUROPSYCH SYMPTOMS:  Is patient less interested in his or her usual activities or in the activities and plans of others? Yes  Does patient get angry or hostile?  Resist care from others? Yes  Does patient act impatient and cranky? Does mood frequently change for no apparent reason? Yes  Does patient have trouble sleeping at night? Yes  Does patient see or hear things that no one else can see or hear? No  Does patient act suspicious without good reason (example: believes that others are stealing from him or her, or planning to harm him or her in some way)? No    SAFETY:  Hearing issue: No  Vision issue: No  Any gait or balance disorder: Yes  Uses: No Assistive Devices  Any falls in the last year: No  Any history of wandering: No  Are there firearms or guns in the home: No  Does patient drive: No  Any driving accidents or citations in the last year: No  Any concerns about patient's ability to drive: Yes  POA papers completed: No    Allergies   Allergen Reactions    Donepezil Other (See Comments)     nightmares       Medications:    Current Outpatient Medications on File Prior to Visit   Medication Sig Dispense Refill    Calcium Carb-Cholecalciferol (calcium carbonate-vitamin D) 500 mg-5 mcg tablet Take 1 tablet by mouth 2 (two) times a day with meals 180 tablet 1    Nutritional Supplements (Ensure Nutrition Shake) LIQD Take 1 Bottle by mouth 2 (two) times a day 237 mL 100    senna (SENOKOT) 8.6 mg Take 1 tablet (8.6 mg total) by mouth daily 90 tablet 3    [DISCONTINUED] QUEtiapine (SEROquel) 100 mg tablet Take 1 tablet (100 mg total) by mouth daily at bedtime 90 tablet 0    [DISCONTINUED] QUEtiapine (SEROquel) 25 mg tablet Take 1 tablet (25 mg total) by mouth in the morning 60 tablet 0    [DISCONTINUED] QUEtiapine (SEROquel) 25 mg tablet Take  1 tablet (25 mg total) by mouth daily at bedtime      [DISCONTINUED] QUEtiapine (SEROquel) 100 mg tablet Take 0.5 tablets (50 mg total) by mouth daily at bedtime (Patient not taking: Reported on 3/11/2025)       No current facility-administered medications on file prior to visit.       History:  Past Medical History:   Diagnosis Date    Moderate late onset Alzheimer's dementia with agitation (HCC) 10/31/2022    No known health problems     Osteopenia of multiple sites 11/30/2022    Primary hypertension 11/30/2022    Slow transit constipation 05/16/2023     Past Surgical History:   Procedure Laterality Date    APPENDECTOMY  2010     Family History   Problem Relation Age of Onset    Breast cancer Neg Hx     Colon cancer Neg Hx     Ovarian cancer Neg Hx     Cancer Neg Hx      Social History     Socioeconomic History    Marital status:      Spouse name: Not on file    Number of children: Not on file    Years of education: Not on file    Highest education level: Not on file   Occupational History    Not on file   Tobacco Use    Smoking status: Never    Smokeless tobacco: Never   Vaping Use    Vaping status: Never Used   Substance and Sexual Activity    Alcohol use: Never    Drug use: Never    Sexual activity: Not Currently     Birth control/protection: Post-menopausal   Other Topics Concern    Not on file   Social History Narrative    Not on file     Social Drivers of Health     Financial Resource Strain: Low Risk  (12/20/2024)    Overall Financial Resource Strain (CARDIA)     Difficulty of Paying Living Expenses: Not very hard   Food Insecurity: No Food Insecurity (12/20/2024)    Hunger Vital Sign     Worried About Running Out of Food in the Last Year: Never true     Ran Out of Food in the Last Year: Never true   Transportation Needs: No Transportation Needs (12/20/2024)    PRAPARE - Transportation     Lack of Transportation (Medical): No     Lack of Transportation (Non-Medical): No   Physical Activity: Not on  "file   Stress: No Stress Concern Present (12/20/2024)    [employer] - Occupational Stress Questionnaire     Feeling of Stress : Not at all   Social Connections: Not on file   Intimate Partner Violence: Not on file   Housing Stability: Low Risk  (12/20/2024)    Housing Stability Vital Sign     Unable to Pay for Housing in the Last Year: No     Number of Times Moved in the Last Year: 0     Homeless in the Last Year: No     Past Surgical History:   Procedure Laterality Date    APPENDECTOMY  2010       OBJECTIVE:  Vitals:    03/11/25 1200   BP: 128/74   BP Location: Left arm   Patient Position: Sitting   Cuff Size: Standard   Pulse: 68   Temp: 98.4 °F (36.9 °C)   TempSrc: Temporal   SpO2: 98%   Weight: 37.9 kg (83 lb 9.6 oz)   Height: 4' 7\" (1.397 m)     Body mass index is 19.43 kg/m².  Physical Exam    5/16/23 MoCA: 4/30 (although unable to complete)       Labs & Imaging:  Lab Results   Component Value Date    WBC 4.78 06/24/2024    HGB 13.7 06/24/2024    HCT 41.6 06/24/2024    MCV 97 06/24/2024     06/24/2024     Lab Results   Component Value Date    SODIUM 143 06/24/2024    K 4.2 06/24/2024     06/24/2024    CO2 28 06/24/2024    AGAP 8 06/24/2024    BUN 19 06/24/2024    CREATININE 0.58 (L) 06/24/2024    GLUF 85 06/24/2024    CALCIUM 9.1 06/24/2024    AST 23 06/24/2024    ALT 25 06/24/2024    ALKPHOS 52 06/24/2024    TP 6.5 06/24/2024    TBILI 0.55 06/24/2024    EGFR 93 06/24/2024     Lab Results   Component Value Date    HGBA1C 5.8 (H) 06/24/2024     Lab Results   Component Value Date    CHOLESTEROL 131 06/24/2024    CHOLESTEROL 219 (H) 06/23/2023    CHOLESTEROL 216 (H) 07/14/2022     Lab Results   Component Value Date    HDL 51 06/24/2024    HDL 55 06/23/2023    HDL 61 07/14/2022     Lab Results   Component Value Date    TRIG 78 06/24/2024    TRIG 148 06/23/2023    TRIG 102 07/14/2022     Lab Results   Component Value Date    NONHDLC 80 06/24/2024    NONHDLC 155 07/14/2022 "     Lab Results   Component Value Date    LDLCALC 64 06/24/2024    LDLCALC 134 (H) 06/23/2023     Lab Results   Component Value Date    TXSMPSYB42 431 07/14/2022     Lab Results   Component Value Date    GDD4WIFXEEFB 1.179 06/24/2024     Lab Results   Component Value Date    RPR Non-Reactive 07/14/2022       Results for orders placed during the hospital encounter of 11/11/22    MRI brain wo contrast    Narrative  MRI BRAIN WITHOUT CONTRAST    INDICATION: R41.3: Other amnesia  R41.89: Other symptoms and signs involving cognitive functions and awareness.    COMPARISON:   None.    TECHNIQUE:  Multiplanar, multisequence imaging of the brain was performed.      IMAGE QUALITY:  Diagnostic.    FINDINGS:    BRAIN PARENCHYMA: Mild cerebral volume loss.  There is no discrete mass, mass effect or midline shift. There is no intracranial hemorrhage.  Diffusion imaging is unremarkable.    Very minimal nonspecific white matter change suggesting microangiopathy.    VENTRICLES:  Normal for the patient's age.    SELLA AND PITUITARY GLAND:  Normal.    ORBITS:  Prior bilateral cataract surgery.    PARANASAL SINUSES:  Minimal ethmoidal air cells mucosal thickening.    VASCULATURE:  Evaluation of the major intracranial vasculature demonstrates appropriate flow voids.    CALVARIUM AND SKULL BASE:  Normal.    EXTRACRANIAL SOFT TISSUES:  Normal.    Impression  Mild cerebral volume loss.  Consider quantitative volumetric brain MRI (NEUROQUANT), if mesial temporal lobe focused neurodegenerative process is concerned.

## 2025-04-01 ENCOUNTER — OFFICE VISIT (OUTPATIENT)
Dept: FAMILY MEDICINE CLINIC | Facility: CLINIC | Age: 70
End: 2025-04-01

## 2025-04-01 ENCOUNTER — APPOINTMENT (OUTPATIENT)
Dept: LAB | Facility: CLINIC | Age: 70
End: 2025-04-01
Payer: COMMERCIAL

## 2025-04-01 VITALS
BODY MASS INDEX: 19 KG/M2 | SYSTOLIC BLOOD PRESSURE: 104 MMHG | HEIGHT: 55 IN | DIASTOLIC BLOOD PRESSURE: 70 MMHG | OXYGEN SATURATION: 99 % | WEIGHT: 82.1 LBS | RESPIRATION RATE: 16 BRPM | TEMPERATURE: 97.9 F | HEART RATE: 102 BPM

## 2025-04-01 DIAGNOSIS — G30.1 MODERATE LATE ONSET ALZHEIMER'S DEMENTIA WITH PSYCHOTIC DISTURBANCE (HCC): Primary | ICD-10-CM

## 2025-04-01 DIAGNOSIS — Z11.1 SCREENING-PULMONARY TB: ICD-10-CM

## 2025-04-01 DIAGNOSIS — F02.B2 MODERATE LATE ONSET ALZHEIMER'S DEMENTIA WITH PSYCHOTIC DISTURBANCE (HCC): Primary | ICD-10-CM

## 2025-04-01 PROCEDURE — G2211 COMPLEX E/M VISIT ADD ON: HCPCS

## 2025-04-01 PROCEDURE — 86480 TB TEST CELL IMMUN MEASURE: CPT

## 2025-04-01 PROCEDURE — 36415 COLL VENOUS BLD VENIPUNCTURE: CPT

## 2025-04-01 PROCEDURE — 99213 OFFICE O/P EST LOW 20 MIN: CPT

## 2025-04-01 NOTE — PROGRESS NOTES
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  Continue management as per geriatric medicine.        Screening-pulmonary TB    Orders:    Quantiferon TB Gold Plus Assay; Future           History of Present Illness   Liseth Richard is a 69 y.o. female  has a past medical history of Moderate late onset Alzheimer's dementia with agitation (HCC), No known health problems, Osteopenia of multiple sites, Primary hypertension, and Slow transit constipation.  has a past surgical history that includes Appendectomy ().    Here today for a physical for [facility]. She is here with her son.       Review of Systems   Constitutional:  Negative for chills and fever.   HENT:  Negative for ear pain and sore throat.    Eyes:  Negative for pain and visual disturbance.   Respiratory:  Negative for cough and shortness of breath.    Cardiovascular:  Negative for chest pain and palpitations.   Gastrointestinal:  Negative for abdominal pain and vomiting.   Genitourinary:  Negative for dysuria and hematuria.   Musculoskeletal:  Negative for arthralgias and back pain.   Skin:  Negative for color change and rash.   Neurological:  Negative for seizures and syncope.   All other systems reviewed and are negative.      Objective   /70 (BP Location: Left arm, Patient Position: Sitting, Cuff Size: Standard)   Pulse 102   Temp 97.9 °F (36.6 °C) (Temporal)   Resp 16   Ht 4' 7\" (1.397 m)   Wt 37.2 kg (82 lb 1.6 oz)   SpO2 99%   BMI 19.08 kg/m²      Physical Exam  Vitals and nursing note reviewed.   Constitutional:       General: She is not in acute " distress.     Appearance: Normal appearance. She is well-developed.   HENT:      Head: Normocephalic and atraumatic.      Right Ear: External ear normal.      Left Ear: External ear normal.      Nose: Nose normal.   Eyes:      Conjunctiva/sclera: Conjunctivae normal.   Cardiovascular:      Rate and Rhythm: Normal rate and regular rhythm.      Pulses: Normal pulses.      Heart sounds: Normal heart sounds. No murmur heard.  Pulmonary:      Effort: Pulmonary effort is normal. No respiratory distress.      Breath sounds: Normal breath sounds.   Abdominal:      Palpations: Abdomen is soft.      Tenderness: There is no abdominal tenderness.   Musculoskeletal:         General: No swelling. Normal range of motion.      Cervical back: Normal range of motion and neck supple.   Skin:     General: Skin is warm and dry.      Capillary Refill: Capillary refill takes less than 2 seconds.   Neurological:      Mental Status: She is alert and oriented to person, place, and time. Mental status is at baseline.   Psychiatric:         Mood and Affect: Mood normal.         Behavior: Behavior normal.         Thought Content: Thought content normal.         Judgment: Judgment normal.

## 2025-04-01 NOTE — ASSESSMENT & PLAN NOTE
Doing better since seroquel was increased from 100 mg daily to BID and starting Zoloft 2 weeks ago.  Continue management as per geriatric medicine.

## 2025-04-03 ENCOUNTER — RESULTS FOLLOW-UP (OUTPATIENT)
Dept: FAMILY MEDICINE CLINIC | Facility: CLINIC | Age: 70
End: 2025-04-03

## 2025-04-03 LAB
GAMMA INTERFERON BACKGROUND BLD IA-ACNC: 0.03 IU/ML
M TB IFN-G BLD-IMP: NEGATIVE
M TB IFN-G CD4+ BCKGRND COR BLD-ACNC: -0.01 IU/ML
M TB IFN-G CD4+ BCKGRND COR BLD-ACNC: 0.02 IU/ML
MITOGEN IGNF BCKGRD COR BLD-ACNC: 9.97 IU/ML

## 2025-04-17 DIAGNOSIS — K59.01 SLOW TRANSIT CONSTIPATION: ICD-10-CM

## 2025-04-17 RX ORDER — SENNOSIDES 8.6 MG/1
1 TABLET, COATED ORAL DAILY
Qty: 90 TABLET | Refills: 0 | Status: SHIPPED | OUTPATIENT
Start: 2025-04-17

## 2025-04-21 ENCOUNTER — TELEPHONE (OUTPATIENT)
Dept: FAMILY MEDICINE CLINIC | Facility: CLINIC | Age: 70
End: 2025-04-21

## 2025-04-21 NOTE — TELEPHONE ENCOUNTER
Patient's son came in to office requesting forms that were left during last office visit for PCP to fill out. Reached out to clinical/clerical team and PCP. No response. Patients son waited for 30 minutes and stated will be back at a later time today to get forms.

## 2025-04-23 NOTE — TELEPHONE ENCOUNTER
Son of the patient call asking for update on his mother form that they drop off on her last appointment, He ask for a call back when this document is done for .

## 2025-04-23 NOTE — TELEPHONE ENCOUNTER
Patient notified form is ready for . Form scanned into patient chart. Form placed in  bin under letter E.

## 2025-05-02 ENCOUNTER — OFFICE VISIT (OUTPATIENT)
Dept: FAMILY MEDICINE CLINIC | Facility: CLINIC | Age: 70
End: 2025-05-02

## 2025-05-02 VITALS
SYSTOLIC BLOOD PRESSURE: 106 MMHG | TEMPERATURE: 98.1 F | RESPIRATION RATE: 21 BRPM | WEIGHT: 82.6 LBS | DIASTOLIC BLOOD PRESSURE: 72 MMHG | HEART RATE: 78 BPM | BODY MASS INDEX: 19.12 KG/M2 | HEIGHT: 55 IN | OXYGEN SATURATION: 99 %

## 2025-05-02 DIAGNOSIS — G30.1 MODERATE LATE ONSET ALZHEIMER'S DEMENTIA WITH PSYCHOTIC DISTURBANCE (HCC): Primary | ICD-10-CM

## 2025-05-02 DIAGNOSIS — F02.B2 MODERATE LATE ONSET ALZHEIMER'S DEMENTIA WITH PSYCHOTIC DISTURBANCE (HCC): Primary | ICD-10-CM

## 2025-05-02 DIAGNOSIS — R13.10 ODYNOPHAGIA: ICD-10-CM

## 2025-05-02 PROCEDURE — 99214 OFFICE O/P EST MOD 30 MIN: CPT

## 2025-05-02 NOTE — PROGRESS NOTES
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 Family reported compliance with meds   - Initial considered referral to senior care, but per chart review Pt has been seen by them and f/u appointment is in October. Medications were modified at last visit.   - Will increase Seroquel to 150 mg HS  - Encourage family to call [facility] or PCP for medication adjustment.    - Referral to Chronic care management for help with f/u with [facility]   Orders:    Ambulatory referral to chronic care management; Future    Odynophagia  - Will try Pepto bismol to see if there is any improvement with complaint. Pt only complaint after meal. F/U with PCP  Orders:    bismuth subsalicylate (PEPTO BISMOL) 524 mg/30 mL oral suspension; Take 15 mL (262 mg total) by mouth every 6 (six) hours as needed for indigestion         History of Present Illness   Patient is a 69 y.o. female whom  has a past medical history of Moderate late onset Alzheimer's dementia with agitation (HCC) (10/31/2022), No known health problems, Osteopenia of multiple sites (2022), Primary hypertension (2022), and Slow transit constipation (2023). who is seen today in office for concern about current meds effective and referral to psych for Pt behavior.  Pt is accompany by son and wife whom are her primary caregivers. Family report that aggressive to self, sometimes other, and she is not sleeping. Pt goes to Day program, son reports due to Pt aggressive behavior and unable to be redirected, facility reduced time at the facility. Son brought in a letter from facility. The letter states that Pt is generally cooperative, calm and engages in  "activities up to lunch time. Pt behavior changes during lunch time, usually at the end. It starts by Pt stating she can not breathe at the end of her meal, complaint about pain in her mouth, and continue with Pt hitting self in the face, violently racking self back and forth in her chair. Pt is unable to be redirected until it time to leave. Pt calm down when she is the bus is here to go home  Son also agreed with report but states that behavior happens at all times at home. Family is overwhelm especially during the night because Pt does not sleep, she keeps everyone up including grandson who needs wake up early for school. Son is frustrated and states if medications do not help, they will put Pt in a long term care facility.        Review of Systems   Constitutional: Negative.    HENT: Negative.     Gastrointestinal: Negative.    Genitourinary: Negative.    Skin: Negative.    Psychiatric/Behavioral:  Positive for agitation, behavioral problems, confusion and decreased concentration. The patient is nervous/anxious.        Objective   /72 (BP Location: Left arm, Patient Position: Sitting, Cuff Size: Standard)   Pulse 78   Temp 98.1 °F (36.7 °C) (Temporal)   Resp 21   Ht 4' 7\" (1.397 m)   Wt 37.5 kg (82 lb 9.6 oz)   SpO2 99%   BMI 19.20 kg/m²      Physical Exam  Constitutional:       General: She is not in acute distress.     Appearance: Normal appearance. She is not ill-appearing.   HENT:      Head: Normocephalic and atraumatic.   Cardiovascular:      Rate and Rhythm: Normal rate.      Pulses: Normal pulses.      Heart sounds: Normal heart sounds.   Pulmonary:      Effort: Pulmonary effort is normal.      Breath sounds: Normal breath sounds.   Abdominal:      General: Bowel sounds are normal.      Palpations: Abdomen is soft.   Musculoskeletal:      Cervical back: Normal range of motion.   Neurological:      Mental Status: She is alert. She is disoriented.         "

## 2025-05-02 NOTE — ASSESSMENT & PLAN NOTE
- Currently on Seroquel 100 mg BID and Zoloft 50 daily. Family reported compliance with meds   - Initial considered referral to senior care, but per chart review Pt has been seen by them and f/u appointment is in October. Medications were modified at last visit.   - Will increase Seroquel to 150 mg HS  - Encourage family to call [facility] or PCP for medication adjustment.    - Referral to Chronic care management for help with f/u with [facility]   Orders:    Ambulatory referral to chronic care management; Future

## 2025-05-05 ENCOUNTER — PATIENT OUTREACH (OUTPATIENT)
Dept: FAMILY MEDICINE CLINIC | Facility: CLINIC | Age: 70
End: 2025-05-05

## 2025-05-05 NOTE — PROGRESS NOTES
Outpatient Care Management Note:    Re: chronic care management     I received referral and reviewed chart . Lionel was identified for chronic care management program . She has  Alzheimer's dementia, weight loss and osteopenia . She lives with her son Cirilo who manages her care . I explained chronic care management program . At this time he declines .   He states that him moms aggressive behaviors are not getting any better with seroquel 150mg twice a day and zoloft 50mg daily . He was asking me if she should see a psychiatrist . Mom is supposed to be in adult day care 8:30 am to 4pm . Due to behaviors she is only allowed in the morning and come home in afternoon . Cirilo states that her behaviors are the same at home and difficult to manage.  He will call Dr Billings . He requested my phone number and will call me if he needs anything . I will close this case .

## 2025-05-07 ENCOUNTER — TELEPHONE (OUTPATIENT)
Age: 70
End: 2025-05-07

## 2025-05-07 NOTE — TELEPHONE ENCOUNTER
Patient son calling if there is any way to get a sooner appt. The patient has an appt on 10/14/2025.     Please call him at [phone] or his wife Jacquie at [phone]    A  was used for this call.

## 2025-05-08 ENCOUNTER — TELEPHONE (OUTPATIENT)
Dept: FAMILY MEDICINE CLINIC | Facility: CLINIC | Age: 70
End: 2025-05-08

## 2025-05-08 NOTE — TELEPHONE ENCOUNTER
----- Message from BERNARD Jasso sent at 5/2/2025  4:03 PM EDT -----  Regarding: appt  Hi, this pt no showed for their appt today. If they are still concerned, can we please schedule them for a virtual on Tuesday or Wednesday morning?

## 2025-06-04 ENCOUNTER — TELEPHONE (OUTPATIENT)
Dept: GASTROENTEROLOGY | Facility: MEDICAL CENTER | Age: 70
End: 2025-06-04

## 2025-07-15 ENCOUNTER — OFFICE VISIT (OUTPATIENT)
Dept: FAMILY MEDICINE CLINIC | Facility: CLINIC | Age: 70
End: 2025-07-15

## 2025-07-15 VITALS
HEIGHT: 55 IN | DIASTOLIC BLOOD PRESSURE: 60 MMHG | HEART RATE: 82 BPM | BODY MASS INDEX: 19.14 KG/M2 | RESPIRATION RATE: 16 BRPM | WEIGHT: 82.7 LBS | TEMPERATURE: 97.1 F | SYSTOLIC BLOOD PRESSURE: 104 MMHG | OXYGEN SATURATION: 95 %

## 2025-07-15 DIAGNOSIS — Z13.1 DIABETES MELLITUS SCREENING: ICD-10-CM

## 2025-07-15 DIAGNOSIS — F02.B2 MODERATE LATE ONSET ALZHEIMER'S DEMENTIA WITH PSYCHOTIC DISTURBANCE (HCC): ICD-10-CM

## 2025-07-15 DIAGNOSIS — G30.1 MODERATE LATE ONSET ALZHEIMER'S DEMENTIA WITH PSYCHOTIC DISTURBANCE (HCC): ICD-10-CM

## 2025-07-15 DIAGNOSIS — Z13.220 SCREENING CHOLESTEROL LEVEL: ICD-10-CM

## 2025-07-15 DIAGNOSIS — Z00.00 ENCOUNTER FOR ANNUAL WELLNESS VISIT (AWV) IN MEDICARE PATIENT: Primary | ICD-10-CM

## 2025-07-15 PROCEDURE — G2211 COMPLEX E/M VISIT ADD ON: HCPCS

## 2025-07-15 PROCEDURE — 99213 OFFICE O/P EST LOW 20 MIN: CPT

## 2025-07-15 PROCEDURE — G0439 PPPS, SUBSEQ VISIT: HCPCS

## 2025-07-15 RX ORDER — SERTRALINE HYDROCHLORIDE 100 MG/1
100 TABLET, FILM COATED ORAL DAILY
Qty: 90 TABLET | Refills: 0 | Status: SHIPPED | OUTPATIENT
Start: 2025-07-15

## 2025-07-23 NOTE — ASSESSMENT & PLAN NOTE
With out recent fall, but with moderate fall risk 2/2 dementia and increasing frailty and weight loss   FAMILY HISTORY:  Mother  Still living? Unknown  Family history of kidney stone, Age at diagnosis: Age Unknown

## 2025-08-14 ENCOUNTER — APPOINTMENT (OUTPATIENT)
Dept: LAB | Facility: CLINIC | Age: 70
End: 2025-08-14
Payer: COMMERCIAL

## 2025-08-14 DIAGNOSIS — Z13.1 DIABETES MELLITUS SCREENING: ICD-10-CM

## 2025-08-14 DIAGNOSIS — Z13.220 SCREENING CHOLESTEROL LEVEL: ICD-10-CM

## 2025-08-14 LAB
ANION GAP SERPL CALCULATED.3IONS-SCNC: 6 MMOL/L (ref 4–13)
BUN SERPL-MCNC: 10 MG/DL (ref 5–25)
CALCIUM SERPL-MCNC: 9.1 MG/DL (ref 8.4–10.2)
CHLORIDE SERPL-SCNC: 104 MMOL/L (ref 96–108)
CHOLEST SERPL-MCNC: 191 MG/DL (ref ?–200)
CO2 SERPL-SCNC: 30 MMOL/L (ref 21–32)
CREAT SERPL-MCNC: 0.54 MG/DL (ref 0.6–1.3)
EST. AVERAGE GLUCOSE BLD GHB EST-MCNC: 105 MG/DL
GFR SERPL CREATININE-BSD FRML MDRD: 94 ML/MIN/1.73SQ M
GLUCOSE P FAST SERPL-MCNC: 84 MG/DL (ref 65–99)
HBA1C MFR BLD: 5.3 %
HDLC SERPL-MCNC: 58 MG/DL
LDLC SERPL CALC-MCNC: 110 MG/DL (ref 0–100)
POTASSIUM SERPL-SCNC: 4 MMOL/L (ref 3.5–5.3)
SODIUM SERPL-SCNC: 140 MMOL/L (ref 135–147)
TRIGL SERPL-MCNC: 116 MG/DL (ref ?–150)

## 2025-08-14 PROCEDURE — 80061 LIPID PANEL: CPT

## 2025-08-14 PROCEDURE — 83036 HEMOGLOBIN GLYCOSYLATED A1C: CPT

## 2025-08-14 PROCEDURE — 36415 COLL VENOUS BLD VENIPUNCTURE: CPT

## 2025-08-14 PROCEDURE — 80048 BASIC METABOLIC PNL TOTAL CA: CPT
